# Patient Record
Sex: MALE | Race: WHITE
[De-identification: names, ages, dates, MRNs, and addresses within clinical notes are randomized per-mention and may not be internally consistent; named-entity substitution may affect disease eponyms.]

---

## 2018-08-30 ENCOUNTER — HOSPITAL ENCOUNTER (OUTPATIENT)
Dept: HOSPITAL 61 - RAD | Age: 53
Discharge: HOME | End: 2018-08-30
Attending: FAMILY MEDICINE
Payer: COMMERCIAL

## 2018-08-30 DIAGNOSIS — N32.89: Primary | ICD-10-CM

## 2018-08-30 PROCEDURE — 74455 X-RAY URETHRA/BLADDER: CPT

## 2018-08-30 RX ADMIN — DIATRIZOATE MEGLUMINE ONE ML: 180 INJECTION, SOLUTION INTRAVESICAL at 10:12

## 2018-08-30 NOTE — RAD
Voiding cystourethrogram, 8/30/2018:



History: Hematuria, bladder cramping



A Biggs catheter was placed in the bladder under aseptic conditions by nursing

personnel.  Contrast was then infused through the catheter into the bladder

via gravity.  Appropriate digital imaging was then obtained during filling and

voiding.  2.4 minutes of fluoroscopy time was utilized.  12 static and dynamic

fluoroscopic sequences were recorded.



There is mild bladder wall irregularity primarily along the dome of the

bladder.  No discrete bladder mass is seen.  There was no vesicoureteral

reflux upon filling or voiding.  The prostatic urethra is unremarkable.  There

is mild narrowing of the membranous urethra as it passes through the

urogenital diaphragm.  This is not unusual.  The anterior urethra is widely

patent.



After voiding on the fluoroscopic table there was a moderate volume of

residual urine in the bladder.  The patient was then sent to the bathroom

where he was able to nearly completely empty his bladder.  The post voiding

view demonstrates surgical clips in the scrotum, presumably due to a previous

vastectomy.





IMPRESSION:

1.  Mild mural irregularity along the superior wall of the urinary bladder

which may be inflammatory.  Given the patient's history of hematuria,

cystoscopic evaluation should be considered to exclude neoplasm.

2.  Mild narrowing of the membranous urethra of doubtful significance.

## 2018-09-24 ENCOUNTER — HOSPITAL ENCOUNTER (OUTPATIENT)
Dept: HOSPITAL 61 - INTRAD | Age: 53
Discharge: HOME | End: 2018-09-24
Attending: SURGERY
Payer: COMMERCIAL

## 2018-09-24 VITALS
SYSTOLIC BLOOD PRESSURE: 85 MMHG | DIASTOLIC BLOOD PRESSURE: 61 MMHG | DIASTOLIC BLOOD PRESSURE: 61 MMHG | SYSTOLIC BLOOD PRESSURE: 85 MMHG

## 2018-09-24 VITALS — HEIGHT: 68 IN | WEIGHT: 146 LBS | BODY MASS INDEX: 22.13 KG/M2
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
## 2018-09-24 DIAGNOSIS — T83.038A: Primary | ICD-10-CM

## 2018-09-24 PROCEDURE — 76080 X-RAY EXAM OF FISTULA: CPT

## 2018-09-24 PROCEDURE — C1894 INTRO/SHEATH, NON-LASER: HCPCS

## 2018-09-24 PROCEDURE — C1729 CATH, DRAINAGE: HCPCS

## 2018-09-24 PROCEDURE — 49423 EXCHANGE DRAINAGE CATHETER: CPT

## 2018-09-24 PROCEDURE — 49424 ASSESS CYST CONTRAST INJECT: CPT

## 2018-09-24 PROCEDURE — 75984 XRAY CONTROL CATHETER CHANGE: CPT

## 2018-09-25 NOTE — RAD
9/24/2018



1. Abscessogram

2. Replacement size of pelvic drainage catheter



Discussion: The risks and benefits of the procedure were discussed the

patient.  Informed consent was obtained.  Timeout procedure was performed. 

The patient was placed in the supine position on fluoroscopy table.  The

anterior pelvis was prepped and draped using sterile barrier technique.



The patient has a percutaneous drainage catheter into what appears to be

recurrent urachal cyst.  The pre-existing drainage catheter has been

intermittently clogging and leaking around the drain.  Fluoroscopically the

drain is normal in position.  Its position was confirmed with CT scan. 

Recurrent gas and air within the collection.  Gas is present within the

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

underlying bowel.  Despite multiple attempts by him he has been unable to see

urologists in the interim since discharge.  A small amount of contrast

administered into the drain delineating the cavity.  A guidewire was advanced

into the cavity over which the pre-existing drain was removed.  Following

dilatation a 14 Malagasy drainage catheter was placed.  Its position was

confirmed with contrast fluoroscopically.  The new catheter was used to

aspirate approximately 50 cc of turbid fluid.  The drain was secured in place

and sterile dressings were applied.



Fluoroscopy time: 1.1 minutes.

Dose area product: 3 gycm2



Impression: Exchange of drainage catheter, now 14 Malagasy in size.  50 cc of

turbid fluid was aspirated.  No immediate complications were identified.  Plan

is for patient to follow-up with urology at .  His appointment is scheduled

for November, will attempt to facilitate earlier visit.









PQRS Compliance Statement:



One or more of the following individualized dose reduction techniques were

utilized for this examination:  

1. Automated exposure control  

2. Adjustment of the mA and/or kV according to patient size  

3. Use of iterative reconstruction technique

## 2020-06-26 ENCOUNTER — HOSPITAL ENCOUNTER (OUTPATIENT)
Dept: HOSPITAL 61 - LAB | Age: 55
Discharge: HOME | End: 2020-06-26
Attending: SURGERY
Payer: COMMERCIAL

## 2020-06-26 DIAGNOSIS — C44.91: ICD-10-CM

## 2020-06-26 DIAGNOSIS — Z11.59: ICD-10-CM

## 2020-06-26 DIAGNOSIS — Z01.818: Primary | ICD-10-CM

## 2020-06-30 ENCOUNTER — HOSPITAL ENCOUNTER (OUTPATIENT)
Dept: HOSPITAL 61 - SURG | Age: 55
Discharge: HOME | End: 2020-06-30
Attending: SURGERY
Payer: COMMERCIAL

## 2020-06-30 VITALS — SYSTOLIC BLOOD PRESSURE: 161 MMHG | DIASTOLIC BLOOD PRESSURE: 89 MMHG

## 2020-06-30 VITALS — BODY MASS INDEX: 23.21 KG/M2 | WEIGHT: 144.4 LBS | HEIGHT: 66 IN

## 2020-06-30 DIAGNOSIS — Z72.89: ICD-10-CM

## 2020-06-30 DIAGNOSIS — Z98.52: ICD-10-CM

## 2020-06-30 DIAGNOSIS — Z90.49: ICD-10-CM

## 2020-06-30 DIAGNOSIS — C44.619: Primary | ICD-10-CM

## 2020-06-30 DIAGNOSIS — I10: ICD-10-CM

## 2020-06-30 PROCEDURE — 88305 TISSUE EXAM BY PATHOLOGIST: CPT

## 2020-06-30 PROCEDURE — 11606 EXC TR-EXT MAL+MARG >4 CM: CPT

## 2020-06-30 NOTE — PDOC4
Operative Note


Operative Note


Operative Note:





Preoperative Diagnosis: Left shoulder basal cell carcinoma





Postoperative Diagnosis: Same





Procedure: Excision of left shoulder basal cell carcinoma, 4 X 2.5 cm excised





Surgeon: Usman





Anesthesia: Local MAC





EBL: 10 mL





Specimen: Left shoulder basal cell, stitch at superior margin





Drains: None





Complications: None





Indication: The patient is a 54-year-old male who recently underwent a punch 

biopsy of a left shoulder skin lesion.  The pathology confirmed a basal cell 

carcinoma and he was referred for excision.  The plan is for excision of the 

lesion with clear margins.  The risks of surgery were discussed.  The risks 

include bleeding, infection, pain, scar tissue, potential need for additional 

surgery or procedure.  He understands and would like to proceed.





Description: The patient was taken the operating room and placed supine on the 

operating bed.  Monitored anesthesia care was provided.  The anterior left 

shoulder was prepped with ChloraPrep and draped in a standard surgical manner.  

The skin of the area was infiltrated with 1% lidocaine with epinephrine.  An 

elliptical incision was made around the skin lesion using a scalpel.  Care was 

taken to ensure fairly wide margins of the lesion.  Sharp dissection was used 

for full-thickness excision of the involved specimen.  The excised lesion measu

red 4 x 2.5 cm.  A stitch was used to freddy the superior margin and it was sent 

to pathology for evaluation.  Hemostasis was achieved with cautery.  The skin 

was reapproximated with interrupted 4-0 nylon sutures.  A sterile dressing was 

then applied.  The patient tolerated the procedure well and was sent to the 

recovery room in stable condition.  At the end of the case all counts were 

correct.











ANKIT TORRES MD             Jun 30, 2020 13:48

## 2020-06-30 NOTE — DISCH
DISCHARGE INSTRUCTIONS


Condition on Discharge


Condition on Discharge:  Stable





Activity After Discharge


Activity Instructions for Disc:  Resume previous activity


Driving Instructions after Dis:  Other, see below (no driving if taking pain 

meds)





Diet after Discharge


Diet after Discharge:  Regular





Wound Incision Care


Wound/Incision Care:  Other, see below (keep dressing clean and dry X 4 days, 

may then remove and shower,  reapply as necessary)





Follow-Up


Follow up with:  Dr Torres in office in 1 week, call for appt [phone]











ANKIT TORRES MD             Jun 30, 2020 13:50

## 2020-07-01 NOTE — PATHOLOGY
[facility] Accession Number: [id number]

.                                                                01

Material submitted:                                        .

shoulder - LEFT SHOULDER SKIN LESION. Modifiers: left

.                                                                01

Clinical history:                                          .

Basal cell carcinoma

.                                                                02

**********************************************************************

Diagnosis:

Skin and subcutaneous tissue, left shoulder lesion excision:

- Basal cell carcinoma, superficial subtype.

- Inked margins of excision free of neoplasm.

- Focal dermal scarring and foreign body giant cell reaction consistent

with previous biopsy site.

(JPM:valdo; 07/01/2020)

MBR  07/01/2020  1553 Local

**********************************************************************

.                                                                02

Electronically signed:                                     .

Benjamín Beal MD, Pathologist

NPI- [id number]

.                                                                01

Gross description:                                         .

The specimen is received in formalin, labeled "Faina Bernal, left shoulder,

stitch at superior margin".  Received is an oriented ellipse of skin

measuring 3.6 x 1.7 x 0.5 cm in greatest dimensions with a suture placed

along one edge designating this as the superior margin (12:00).  The

surgical margins are inked as follows: 12 to 3:00-yellow, 3 to 9:00-black,

and 9 to 12:00-blue.  The epidermal surface displays a well-circumscribed,

flat, flaky and pale tan lesion measuring 0.7 x 0.7 cm.  The specimen is

sectioned into 11 pieces and entirely submitted in cassettes A1 through

A4, with the 3 and 9:00 aspects placed in cassette A4.

(CAA; 6/30/2020)

QAC/QAC  06/30/2020  1746 Local

.                                                                02

Pathologist provided ICD-10:

C44.619

.                                                                02

CPT                                                        .

641705

Specimen Comment: A courtesy copy of this report has been sent to [phone], [phone]

Specimen Comment: [id number]

Specimen Comment: Report sent to  / DR SÁNCHEZ

***Performed at:  01

   [facility] [address], [place]  [id number]

   MD Saad Nuñez MD Phone:  [phone]

***Performed at:  02

   [facility]

   [address] [place], [place]  [id number]

   MD Benjamín Beal MD Phone:  [phone]

## 2022-04-07 ENCOUNTER — HOSPITAL ENCOUNTER (INPATIENT)
Dept: HOSPITAL 61 - ER | Age: 57
LOS: 4 days | Discharge: HOME | DRG: 871 | End: 2022-04-11
Attending: INTERNAL MEDICINE | Admitting: INTERNAL MEDICINE
Payer: COMMERCIAL

## 2022-04-07 VITALS — SYSTOLIC BLOOD PRESSURE: 106 MMHG | DIASTOLIC BLOOD PRESSURE: 75 MMHG

## 2022-04-07 VITALS — SYSTOLIC BLOOD PRESSURE: 107 MMHG | DIASTOLIC BLOOD PRESSURE: 76 MMHG

## 2022-04-07 VITALS — BODY MASS INDEX: 22.66 KG/M2 | WEIGHT: 144.4 LBS | HEIGHT: 67 IN

## 2022-04-07 VITALS — DIASTOLIC BLOOD PRESSURE: 64 MMHG | SYSTOLIC BLOOD PRESSURE: 108 MMHG

## 2022-04-07 DIAGNOSIS — R00.0: ICD-10-CM

## 2022-04-07 DIAGNOSIS — Z93.3: ICD-10-CM

## 2022-04-07 DIAGNOSIS — M10.9: ICD-10-CM

## 2022-04-07 DIAGNOSIS — Z82.49: ICD-10-CM

## 2022-04-07 DIAGNOSIS — E43: ICD-10-CM

## 2022-04-07 DIAGNOSIS — N18.30: ICD-10-CM

## 2022-04-07 DIAGNOSIS — I12.9: ICD-10-CM

## 2022-04-07 DIAGNOSIS — F10.10: ICD-10-CM

## 2022-04-07 DIAGNOSIS — L03.114: ICD-10-CM

## 2022-04-07 DIAGNOSIS — Z90.49: ICD-10-CM

## 2022-04-07 DIAGNOSIS — A41.9: Primary | ICD-10-CM

## 2022-04-07 DIAGNOSIS — N17.9: ICD-10-CM

## 2022-04-07 DIAGNOSIS — Z87.11: ICD-10-CM

## 2022-04-07 LAB
ALBUMIN SERPL-MCNC: 2.6 G/DL (ref 3.4–5)
ALBUMIN/GLOB SERPL: 0.6 {RATIO} (ref 1–1.7)
ALP SERPL-CCNC: 107 U/L (ref 46–116)
ALT SERPL-CCNC: 10 U/L (ref 16–63)
ANION GAP SERPL CALC-SCNC: 14 MMOL/L (ref 6–14)
APTT BLD: 47 SEC (ref 24–38)
AST SERPL-CCNC: 9 U/L (ref 15–37)
BASOPHILS # BLD AUTO: 0.1 X10^3/UL (ref 0–0.2)
BASOPHILS NFR BLD: 1 % (ref 0–3)
BILIRUB SERPL-MCNC: 0.8 MG/DL (ref 0.2–1)
BUN SERPL-MCNC: 19 MG/DL (ref 8–26)
BUN/CREAT SERPL: 11 (ref 6–20)
CALCIUM SERPL-MCNC: 10.1 MG/DL (ref 8.5–10.1)
CHLORIDE SERPL-SCNC: 101 MMOL/L (ref 98–107)
CO2 SERPL-SCNC: 22 MMOL/L (ref 21–32)
CREAT SERPL-MCNC: 1.7 MG/DL (ref 0.7–1.3)
EOSINOPHIL NFR BLD: 0.1 X10^3/UL (ref 0–0.7)
EOSINOPHIL NFR BLD: 1 % (ref 0–3)
ERYTHROCYTE [DISTWIDTH] IN BLOOD BY AUTOMATED COUNT: 18.6 % (ref 11.5–14.5)
GFR SERPLBLD BASED ON 1.73 SQ M-ARVRAT: 41.6 ML/MIN
GLUCOSE SERPL-MCNC: 111 MG/DL (ref 70–99)
HCT VFR BLD CALC: 27.4 % (ref 39–53)
HGB BLD-MCNC: 9 G/DL (ref 13–17.5)
LYMPHOCYTES # BLD: 0.8 X10^3/UL (ref 1–4.8)
LYMPHOCYTES NFR BLD AUTO: 8 % (ref 24–48)
MCH RBC QN AUTO: 29 PG (ref 25–35)
MCHC RBC AUTO-ENTMCNC: 33 G/DL (ref 31–37)
MCV RBC AUTO: 89 FL (ref 79–100)
MONO #: 0.7 X10^3/UL (ref 0–1.1)
MONOCYTES NFR BLD: 7 % (ref 0–9)
NEUT #: 8.7 X10^3/UL (ref 1.8–7.7)
NEUTROPHILS NFR BLD AUTO: 84 % (ref 31–73)
PLATELET # BLD AUTO: 262 X10^3/UL (ref 140–400)
POTASSIUM SERPL-SCNC: 4 MMOL/L (ref 3.5–5.1)
PROT SERPL-MCNC: 6.9 G/DL (ref 6.4–8.2)
PROTHROMBIN TIME: 15 SEC (ref 11.7–14)
RBC # BLD AUTO: 3.08 X10^6/UL (ref 4.3–5.7)
SODIUM SERPL-SCNC: 137 MMOL/L (ref 136–145)
URATE SERPL-MCNC: 11 MG/DL (ref 3.5–7.2)
WBC # BLD AUTO: 10.4 X10^3/UL (ref 4–11)

## 2022-04-07 PROCEDURE — 84443 ASSAY THYROID STIM HORMONE: CPT

## 2022-04-07 PROCEDURE — 84550 ASSAY OF BLOOD/URIC ACID: CPT

## 2022-04-07 PROCEDURE — 96375 TX/PRO/DX INJ NEW DRUG ADDON: CPT

## 2022-04-07 PROCEDURE — 96365 THER/PROPH/DIAG IV INF INIT: CPT

## 2022-04-07 PROCEDURE — 73130 X-RAY EXAM OF HAND: CPT

## 2022-04-07 PROCEDURE — 36415 COLL VENOUS BLD VENIPUNCTURE: CPT

## 2022-04-07 PROCEDURE — 71045 X-RAY EXAM CHEST 1 VIEW: CPT

## 2022-04-07 PROCEDURE — 82550 ASSAY OF CK (CPK): CPT

## 2022-04-07 PROCEDURE — 85651 RBC SED RATE NONAUTOMATED: CPT

## 2022-04-07 PROCEDURE — 80048 BASIC METABOLIC PNL TOTAL CA: CPT

## 2022-04-07 PROCEDURE — 84484 ASSAY OF TROPONIN QUANT: CPT

## 2022-04-07 PROCEDURE — 80053 COMPREHEN METABOLIC PANEL: CPT

## 2022-04-07 PROCEDURE — 86140 C-REACTIVE PROTEIN: CPT

## 2022-04-07 PROCEDURE — 83550 IRON BINDING TEST: CPT

## 2022-04-07 PROCEDURE — 85610 PROTHROMBIN TIME: CPT

## 2022-04-07 PROCEDURE — 87040 BLOOD CULTURE FOR BACTERIA: CPT

## 2022-04-07 PROCEDURE — 85025 COMPLETE CBC W/AUTO DIFF WBC: CPT

## 2022-04-07 PROCEDURE — G0378 HOSPITAL OBSERVATION PER HR: HCPCS

## 2022-04-07 PROCEDURE — 93005 ELECTROCARDIOGRAM TRACING: CPT

## 2022-04-07 PROCEDURE — 83540 ASSAY OF IRON: CPT

## 2022-04-07 PROCEDURE — 86200 CCP ANTIBODY: CPT

## 2022-04-07 PROCEDURE — 86431 RHEUMATOID FACTOR QUANT: CPT

## 2022-04-07 PROCEDURE — 96361 HYDRATE IV INFUSION ADD-ON: CPT

## 2022-04-07 PROCEDURE — 83036 HEMOGLOBIN GLYCOSYLATED A1C: CPT

## 2022-04-07 PROCEDURE — 73218 MRI UPPER EXTREMITY W/O DYE: CPT

## 2022-04-07 PROCEDURE — 85730 THROMBOPLASTIN TIME PARTIAL: CPT

## 2022-04-07 PROCEDURE — 83605 ASSAY OF LACTIC ACID: CPT

## 2022-04-07 RX ADMIN — FEBUXOSTAT SCH MG: 40 TABLET ORAL at 15:39

## 2022-04-07 RX ADMIN — MINERAL SUPPLEMENT IRON 300 MG / 5 ML STRENGTH LIQUID 100 PER BOX UNFLAVORED SCH MG: at 16:29

## 2022-04-07 RX ADMIN — FAMOTIDINE SCH MG: 20 TABLET ORAL at 21:32

## 2022-04-07 RX ADMIN — VANCOMYCIN HYDROCHLORIDE PRN EACH: 1 INJECTION, POWDER, LYOPHILIZED, FOR SOLUTION INTRAVENOUS at 15:01

## 2022-04-07 NOTE — RAD
Single AP view of the chest.



Comparison:  9/15/2018.



Indication: Tachycardia



Findings: 



Healed left midclavicular fractures are identified. Stable degenerative changes of bilateral shoulder
s. The heart is enlarged but stable.  There is no pneumothorax or effusion. No air space or interstit
ial disease.



Impression: 



1. No acute cardiopulmonary process.





Electronically signed by: Ancelmo Chester MD (4/7/2022 11:43 AM) [id number]

## 2022-04-07 NOTE — EKG
[facility]

              [address] [place] [address]

Test Date:    2022               Test Time:    10:27:03

Pat Name:     RUBY HAUSER              Department:   

Patient ID:   [id number]           Room:          

Gender:       M                        Technician:   

:          [id number]               Requested By: RAYNA LOPEZ

Order Number: [id number]           Reading MD:   Tam Valdes

                                 Measurements

Intervals                              Axis          

Rate:         123                      P:            24

CT:           168                      QRS:          0

QRSD:         80                       T:            174

QT:           286                                    

QTc:          414                                    

                           Interpretive Statements

SINUS TACHYCARDIA

LEFTWARD AXIS

LOW LIMB LEAD VOLTAGE

Electronically Signed On 4- 14:22:46 CDT by Tam Valdes

## 2022-04-07 NOTE — RAD
Study: MRI of the left hand without contrast



INDICATION: Left hand infection. Abscess evaluation.



COMPARISON: Radiographs from 4/7/2022



TECHNIQUE: Multiplanar MR imaging of the left hand performed without contrast.



FINDINGS:



Confluent fluid attenuation along the dorsal half of the abductor digiti minimi muscle. The muscle be
lly itself is minimally edematous at its base. Subcutaneous edema/fluid signal mainly along the dorsu
m of the hand and wrist and most confluent along the metacarpals. Small volume extensor tendon sheath
 fluid with the exception of the first compartment. Peritendinous edema/fluid along the extensors mor
e distally. No significant fluid distention on the flexor tendon sheaths and with less pronounced fle
xor peritendinous edema along the hand compared to the extensors. Wrist and distal radioulnar joint e
ffusions which are small/moderate and with synovitis.



Small joint effusions and mild synovitis primarily at the index, long and little finger MCP joints an
d long finger PIP joint.



Severe thumb CMC arthrosis. Joint space narrowing at the index and long finger MCP joints. Multifocal
 interphalangeal joint arthrosis greatest at the index finger DIP joint. Distal radioulnar joint arth
rosis. No signal changes of osteomyelitis. No aggressive erosion.



IMPRESSION:

1.  The study is limited in its ability to detect an abscess without intravenous contrast.

2.  Confluent fluid signal centered along the dorsal half of the abductor digiti minimi the sterility
 of which is uncertain. There is also edema and fluid signal primarily along the dorsum of the hand d
own to the wrist and greatest along the metacarpals. This is indeterminate for bland edema, celluliti
s or phlegmonous change which are not able to be differentiated by noncontrast MRI. Small/moderate wr
ist and DRUJ effusions with synovitis. It is uncertain how much fluid is present as synovial thickeni
ng cannot be differentiated from fluid. No signal changes of osteomyelitis. If there is concern for s
eptic arthritis or an abscess, ultrasound could assess for a drainable fluid collection and determine
 how much fluid is present within the joint spaces.

3.  Small joint effusions/synovitis primarily involving the index, long and little finger MCP joints 
and the long finger PIP joint also sterility indeterminant.

4.  Multifocal arthrosis which is severe at the thumb CMC joint.



Electronically signed by: BRII CABA MD (4/7/2022 9:53 PM) [facility]IC-ONOF

## 2022-04-07 NOTE — PHYS DOC
Past Medical History


Past Medical History:  Diverticulitis, GI Bleed, Hypertension, P.U.D., Renal 

Failure


Additional Past Medical Histor:  Gout


Past Surgical History:  Other


Additional Past Surgical Histo:  CYST REMOVAL, BLADDER SX, bowel resection with 

reversal


Smoking Status:  Never Smoker


Alcohol Use:  Sober


Additional Information:  


Pt states sober x 14 weeks.





General Adult


EDM:


Chief Complaint:  HAND PROBLEM





HPI:


HPI:





Patient is a 56-year-old male who presents today with right hand pain.  While 

the nursing staff was doing the triage assessment of this patient it was found 

that the heart patient's heart rate was in the 120s to 130s, patient was then 

moved to room and placed on the cardiac monitor.  Patient states that his hand 

pain started on Monday and that he has had no fever and chills related to it he 

says is related to his gout.  But after talking with the patient further come to

find out the patient was admitted to [facility] on February 28, 2022 after 

syncopal episode and patient was found to have a bleeding ulcer, he states that 

while in the hospital he required an EGD, and transfusions x2 units.  Patient 

states he was also in the intensive care unit for couple days and intubated at 

that time.  Patient states he saw his primary care physician last week Dr. Hamilton

and his medications were adjusted due to his low blood pressures.





Review of Systems:


Review of Systems:


Constitutional:   Denies fever or chills. []


Eyes:   Denies change in visual acuity. []


HENT:   Denies nasal congestion or sore throat. [] 


Respiratory:   Denies cough or shortness of breath. [] 


Cardiovascular:   Denies chest pain or edema. [] 


GI:   Denies abdominal pain, nausea, vomiting, bloody stools or diarrhea. [] 


:  Denies dysuria. [] 


Musculoskeletal: Left hand pain and swelling 


Integument:   Denies rash. [] 


Neurologic:   Denies headache, focal weakness or sensory changes. [] 


Endocrine:   Denies polyuria or polydipsia. [] 


Lymphatic:  Denies swollen glands. [] 


Psychiatric:  Denies depression or anxiety. []





Heart Score:


C/O Chest Pain:  No


Risk Factors:


Risk Factors:  DM, Current or recent (<one month) smoker, HTN, HLP, family 

history of CAD, obesity.


Risk Scores:


Score 0 - 3:  2.5% MACE over next 6 weeks - Discharge Home


Score 4 - 6:  20.3% MACE over next 6 weeks - Admit for Clinical Observation


Score 7 - 10:  72.7% MACE over next 6 weeks - Early Invasive Strategies





Allergies:


Allergies:





Allergies








Coded Allergies Type Severity Reaction Last Updated Verified


 


  No Known Drug Allergies    6/30/20 No











Physical Exam:


PE:





Constitutional: Well developed, well nourished, no acute distress, non-toxic 

appearance. []


HENT: Normocephalic, atraumatic, bilateral external ears normal, oropharynx 

moist, no oral exudates, nose normal. []


Eyes: PERRLA, EOMI, conjunctiva normal, no discharge. [] 


Neck: Normal range of motion, no tenderness, supple, no stridor. [] 


Cardiovascular:Heart rate regular rhythm, no murmur []


Lungs & Thorax:  Bilateral breath sounds clear to auscultation []


Abdomen: Bowel sounds normal, soft, no tenderness, no masses, no pulsatile 

masses, well-healed scar from the umbilicus to the symphysis pubis noted


Skin: Warm, dry, no erythema, no rash. [] 


Back: No tenderness, no CVA tenderness. [] 


Extremities: Right hand is swollen and reddened along the metacarpal joints, 

patient has decreased range of motion due to pain and swelling, cap refill is 

less than 2 seconds sensory is intact distal to the swelling and pain, 

peripheral pulses are 2+


Neurologic: Alert and oriented X 3, normal motor function, normal sensory 

function, no focal deficits noted. []


Psychologic: Affect normal, judgement normal, mood anxious. []





Current Patient Data:


Labs:





Laboratory Tests








Test


 4/7/22


11:14


 


White Blood Count 10.4 x10^3/uL 


 


Red Blood Count 3.08 x10^6/uL 


 


Hemoglobin 9.0 g/dL 


 


Hematocrit 27.4 % 


 


Mean Corpuscular Volume 89 fL 


 


Mean Corpuscular Hemoglobin 29 pg 


 


Mean Corpuscular Hemoglobin


Concent 33 g/dL 





 


Red Cell Distribution Width 18.6 % 


 


Platelet Count 262 x10^3/uL 


 


Neutrophils (%) (Auto) 84 % 


 


Lymphocytes (%) (Auto) 8 % 


 


Monocytes (%) (Auto) 7 % 


 


Eosinophils (%) (Auto) 1 % 


 


Basophils (%) (Auto) 1 % 


 


Neutrophils # (Auto) 8.7 x10^3/uL 


 


Lymphocytes # (Auto) 0.8 x10^3/uL 


 


Monocytes # (Auto) 0.7 x10^3/uL 


 


Eosinophils # (Auto) 0.1 x10^3/uL 


 


Basophils # (Auto) 0.1 x10^3/uL 


 


Sodium Level 137 mmol/L 


 


Potassium Level 4.0 mmol/L 


 


Chloride Level 101 mmol/L 


 


Carbon Dioxide Level 22 mmol/L 


 


Anion Gap 14 


 


Blood Urea Nitrogen 19 mg/dL 


 


Creatinine 1.7 mg/dL 


 


Estimated GFR


(Cockcroft-Gault) 41.6 





 


BUN/Creatinine Ratio 11 


 


Glucose Level 111 mg/dL 


 


Lactic Acid Level 2.6 mmol/L 


 


Uric Acid 11.0 mg/dL 


 


Calcium Level 10.1 mg/dL 


 


Total Bilirubin 0.8 mg/dL 


 


Aspartate Amino Transf


(AST/SGOT) 9 U/L 





 


Alanine Aminotransferase


(ALT/SGPT) 10 U/L 





 


Alkaline Phosphatase 107 U/L 


 


Troponin I High Sensitivity 7 ng/L 


 


Total Protein 6.9 g/dL 


 


Albumin 2.6 g/dL 


 


Albumin/Globulin Ratio 0.6 








Current Medications








 Medications


  (Trade)  Dose


 Ordered  Sig/Kim


 Route


 PRN Reason  Start Time


 Stop Time Status Last Admin


Dose Admin


 


 Sodium Chloride  1,000 ml @ 


 999 mls/hr  1X  ONCE


 IV


   4/7/22 12:15


 4/7/22 13:15  4/7/22 12:28





 


 Fentanyl Citrate


  (Fentanyl 2ml


 Vial)  50 mcg  1X  ONCE


 IVP


   4/7/22 12:45


 4/7/22 12:46  4/7/22 12:29











Vital Signs:





Vital Signs








  Date Time  Temp Pulse Resp B/P (MAP) Pulse Ox O2 Delivery O2 Flow Rate FiO2


 


4/7/22 12:30  116 18 113/84 (94) 100 Room Air  


 


4/7/22 12:29   18  98   


 


4/7/22 12:00  115 18 113/81 (92) 100 Room Air  


 


4/7/22 11:30  114 18 111/85 (94) 100 Room Air  


 


4/7/22 11:00  119 18 119/82 (94) 100 Room Air  


 


4/7/22 10:32  121 20 115/79 (91) 100 Room Air  


 


4/7/22 10:06 98.9 154 22 110/80 (90) 96 Room Air  





 98.9       








Vital Signs








  Date Time  Temp Pulse Resp B/P (MAP) Pulse Ox O2 Delivery O2 Flow Rate FiO2


 


4/7/22 12:29   18  98   


 


4/7/22 10:32  121 20 115/79 (91) 100 Room Air  


 


4/7/22 10:06 98.9 154 22 110/80 (90) 96 Room Air  





 98.9       








                                   Vital Signs








  Date Time  Temp Pulse Resp B/P (MAP) Pulse Ox O2 Delivery O2 Flow Rate FiO2


 


4/7/22 10:06 98.9 154 22 110/80 (90) 96 Room Air  





 98.9       











EKG:


EKG:


EKG done at 1027 read by Dr. Winters at 1029 shows sinus tachycardia with no 

ectopy at a rate of 123 with a PA interval of 168 ms with a QTC of 414 no STEMI 

[]





Radiology/Procedures:


Radiology/Procedures:


REASON: hand swelling. HX OF GOUT


PROCEDURE: HAND LEFT 3V





EXAM:  PA, oblique and lateral views left hand





DATE: 4/7/2022 10:40 AM





INDICATION: Reason: hand swelling. HX OF GOUT / Spl. Instructions:  / History: .





COMPARISON: No Prior





FINDINGS:


No evidence of acute fracture or dislocation. Diffuse soft tissue swelling about

 the left long, index, and ring fingers. Advanced thumb CMC DJD. Scattered IP 

joint degenerative changes. Small juxta-articular erosion is suspected at the 

distal phalanx left index finger. Old/healed fracture fifth metacarpal shaft. 

Atherosclerotic vascular calcifications are seen.





IMPRESSION:


No acute fracture or dislocation.


Diffuse soft tissue swelling about the digits as well as the extra articular 

erosion at the middle phalanx left index finger could be seen with crystalline 

arthropathy such as gout. 





Electronically signed by: Shakeel Thompson MD (4/7/2022 11:54 AM) [id number]





REASON: tachycardia


PROCEDURE: CHEST AP ONLY





Single AP view of the chest.





Comparison:  9/15/2018.





Indication: Tachycardia





Findings: 





Healed left midclavicular fractures are identified. Stable degenerative changes 

of bilateral shoulders. The heart is enlarged but stable.  There is no 

pneumothorax or effusion. No air space or interstitial disease.





Impression: 





1. No acute cardiopulmonary process.








Electronically signed by: Ancelmo Chester MD (4/7/2022 11:43 AM) [id number]

















[]





Course & Med Decision Making:


Course & Med Decision Making


Pertinent Labs and Imaging studies reviewed. (See chart for details)





1240 reviewed radiological and laboratory results with patient did express 

concern about the possibility of having cellulitis in his left hand, and 

admitting the patient for IV antibiotics is my recommendation, he is agreeable 

to the plan, Dr. Bartholomew was paged.





Vance Disclaimer:


Dragon Disclaimer:


This electronic medical record was generated, in whole or in part, using a voice

 recognition dictation system.





Departure


Departure


Impression:  


   Primary Impression:  


   Cellulitis


   Qualified Codes:  L03.114 - Cellulitis of left upper limb


Admitting Physician:  HIMS


Condition:  STABLE


Referrals:  


JOSEPH SÁNCHEZ MD (PCP)











RAYNA LOPEZ        Apr 7, 2022 10:49

## 2022-04-07 NOTE — PDOC2
CONSULT


Date of Consult


Date of Consult


DATE: 22 


TIME: 16:53





Reason for Consult


Reason for Consult:


L hand swelling/pain/cellulitis





Identification/Chief Complaint


Chief Complaint


L hand pain/swelling/unable to use





History of Present Illness


Reason for Visit:


Patient reports that he started having pain and swelling in his left hand on 

2022.  He denies any injury to the hand and says he is not sure why it 

started hurting and swelling.  He thought that it would just go away on its own 

but it continued to get worse.  He presented to the emergency room at [facility]

today for treatment of it because it continued to bother him.  He denies any 

fevers at home but says that he was having chills, he is not exactly sure when 

this started.  He denies any numbness or tingling or radicular symptoms in the 

hand.  No other areas of concern today.





The patient reports that about a month ago he was admitted at [facility] for 

a stomach ulcer.  He was having black stools and stomach pain and was found to 

have an ulcer that he says was cauterized.  His symptoms for that have resolved 

since he left the hospital.





Past Medical History


Past Medical History


Significant for hypertension.  The chart is positive for gout but the patient 

does not mention this.


Cardiovascular:  HTN


Pulmonary:  No pertinent hx


GI:  No pertinent hx


Heme/Onc:  No pertinent hx


Psych:  No pertinent hx


Musculoskeletal:   low back pain


Infectious disease:  No pertinent hx


Renal/:  No pertinent hx





Past Surgical History


Past Surgical History


Significant for cyst removal around his umbilicus that was infected, colon 

perforation which resulted in colostomy.  He then had a partial colon resection 

and then finally a reattachment of his colon.  Is also had right elbow 

reconstruction, left ACL reconstruction, gallbladder removal, appendectomy and 

recent stomach ulcer cauterization.


Past Surgical History:  Other





Family History


Family History


Patient reports that his niece had blood clots in her legs that she  from.


Family History:  Hypertension





Social History


Social History


Patient reports that he chews about a can of tobacco a week and has for 

approximately 48 years.  He reports that he quit drinking alcohol about 14 weeks

ago without formal treatment.  He has been in formal alcohol treatment in the 

past.  He says that prior to quitting, he drank a lot and does not quantify 

further.  He denies drug use.  He lives at home in the community.  He does not w

ork.


No


ALCOHOL:  none


Drugs:  None


Lives:  with Family





Current Problem List


Problem List


Problems


Medical Problems:


(1) Cellulitis


Status: Acute  











Current Medications


Current Medications


Home medications per the patient are magnesium and a blood pressure medicine, 

unknown name.  He says that his metoprolol was stopped at his recent primary 

care visit.


Current Medications


Sodium Chloride 1,000 ml @  999 mls/hr 1X  ONCE IV  Last administered on 

22at 12:28;  Start 22 at 12:15;  Stop 22 at 13:15;  Status DC


Fentanyl Citrate (Fentanyl 2ml Vial) 50 mcg 1X  ONCE IVP  Last administered on 

22at 12:29;  Start 22 at 12:45;  Stop 22 at 12:46;  Status DC


Vancomycin HCl (Vanco Per Pharmacy) 1 each PRN DAILY  PRN MC SEE COMMENTS Last 

administered on 22at 15:01;  Start 22 at 12:45


Vancomycin HCl 1.25 gm/Sodium Chloride 250 ml @  166.667 mls/hr 1X  ONCE IV  

Last administered on 22at 13:12;  Start 22 at 13:00;  Stop 22 at 

14:29;  Status DC


Fentanyl Citrate (Fentanyl 2ml Vial) 50 mcg PRN Q2HR  PRN IVP PAIN;  Start 

22 at 13:00;  Stop 22 at 12:59


Febuxostat (Uloric) 40 mg DAILY PO  Last administered on 22at 15:39;  Start 

22 at 15:00


Metoprolol Succinate (Toprol Xl) 100 mg DAILY PO ;  Start 22 at 09:00;  Stop

22 at 14:57;  Status DC


Colchicine (Colcrys) 0.6 mg 1X  ONCE PO  Last administered on 22at 15:39;  

Start 22 at 15:00;  Stop 22 at 15:01;  Status DC


Colchicine (Colcrys) 0.6 mg DAILY PO ;  Start 22 at 09:00


Famotidine (Pepcid) 20 mg QHS PO ;  Start 22 at 21:00


Metoprolol Succinate (Toprol Xl) 50 mg DAILY PO ;  Start 22 at 09:00


Sodium Chloride 1,000 ml @  125 mls/hr 1X  ONCE IV  Last administered on at 15:40;  Start 22 at 15:00;  Stop 22 at 22:59


Sodium Chloride 1,000 ml @  1,000 mls/hr 1X  ONCE IV  Last administered on 

22at 15:40;  Start 22 at 15:00;  Stop 22 at 15:59;  Status DC


Vancomycin HCl 1 gm/Sodium Chloride 250 ml @  250 mls/hr Q24H IV ;  Start 22

at 13:00


Vancomycin HCl (Vancomycin Trough Level) 1 each 1X  ONCE MC ;  Start 22 at 

12:30;  Stop 22 at 12:31


Enoxaparin Sodium (Lovenox Per Pharmacy Prophylaxis Dosing) 1 each PRN DAILY  

PRN MC SEE COMMENTS;  Start 22 at 15:00


Vitamin B Complex/ Vitamin C (Edda-Monique) 1 tab DAILY PO ;  Start 22 at 09:00


Ferrous Sulfate (Iron Oral Solution) 300 mg DAILYBFRSUP PO ;  Start 22 at 

17:00


Acetaminophen (Tylenol) 650 mg PRN Q6HRS  PRN PO MILD PAIN / TEMP > 100.3'F;  

Start 22 at 16:15





Active Scripts


Active


Reported


Aspir-Low (Aspirin) 81 Mg Tablet.dr 1 Tab PO DAILY


Indomethacin 50 Mg Capsule 1 Cap PO TID PRN


Metoprolol Succinate 50 Mg Tab.er.24h 100 Mg PO DAILY





Allergies


Allergies:  


Coded Allergies:  


     No Known Drug Allergies (Unverified , 20)





ROS


Review of System


Patient denies headache.  He denies any blurry vision or double vision.  No 

ringing in the ears or difficulty hearing.  No difficulty swallowing.  No 

thyroid problems or diabetes.  No chest pain or shortness of breath.  No nausea 

or vomiting.  He has had pain in his abdomen from his recent stomach ulcer but 

that has resolved.  He has had blood in his stool but that has resolved after 

his ulcer was cauterized.  No blood in the urine.  No constipation or diarrhea. 

No dysuria.  No psychiatric illnesses.


General:  YES: Chills





Physical Exam


Physical Exam


Patient is a 56-year-old man who appears well-developed, well-nourished and in 

no acute distress.  He appears slightly diaphoretic.  He interacts with exam 

appropriately and is alert and oriented x3.  He is left-hand dominant.





The left hand has intact skin.  He does have scattered scars on the dorsum of 

the hand.  There is moderate swelling and erythema in the dorsum of the hand.  

Specifically he does have erythema over the distal ulna.  He has significant 

swelling in the long finger.  The patient will not allow any passive range of 

motion of his fingers hand or wrist due to pain.  There is warmth from the hand 

when compared to the contralateral side.  There is no swelling or erythema in 

the palm of the hand or the flexor surfaces of the fingers.  2+ radial and 2+ 

ulnar pulses.  Normal sensation to light touch throughout the hand.  He will not

move his hand for evaluation of his motor function.  The patient does guard his 

hand significantly.





Vitals


VITALS





Vital Signs








  Date Time  Temp Pulse Resp B/P (MAP) Pulse Ox O2 Delivery O2 Flow Rate FiO2


 


22 15:00 100.8 122 18 107/76 (86)  Room Air  





 100.8       


 


22 12:30     100   











Labs


Labs





Laboratory Tests








Test


 22


11:14 22


15:39


 


White Blood Count


 10.4 x10^3/uL


(4.0-11.0) 





 


Red Blood Count


 3.08 x10^6/uL


(4.30-5.70) 





 


Hemoglobin


 9.0 g/dL


(13.0-17.5) 





 


Hematocrit


 27.4 %


(39.0-53.0) 





 


Mean Corpuscular Volume 89 fL ()  


 


Mean Corpuscular Hemoglobin 29 pg (25-35)  


 


Mean Corpuscular Hemoglobin


Concent 33 g/dL


(31-37) 





 


Red Cell Distribution Width


 18.6 %


(11.5-14.5) 





 


Platelet Count


 262 x10^3/uL


(140-400) 





 


Neutrophils (%) (Auto) 84 % (31-73)  


 


Lymphocytes (%) (Auto) 8 % (24-48)  


 


Monocytes (%) (Auto) 7 % (0-9)  


 


Eosinophils (%) (Auto) 1 % (0-3)  


 


Basophils (%) (Auto) 1 % (0-3)  


 


Neutrophils # (Auto)


 8.7 x10^3/uL


(1.8-7.7) 





 


Lymphocytes # (Auto)


 0.8 x10^3/uL


(1.0-4.8) 





 


Monocytes # (Auto)


 0.7 x10^3/uL


(0.0-1.1) 





 


Eosinophils # (Auto)


 0.1 x10^3/uL


(0.0-0.7) 





 


Basophils # (Auto)


 0.1 x10^3/uL


(0.0-0.2) 





 


Sodium Level


 137 mmol/L


(136-145) 





 


Potassium Level


 4.0 mmol/L


(3.5-5.1) 





 


Chloride Level


 101 mmol/L


() 





 


Carbon Dioxide Level


 22 mmol/L


(21-32) 





 


Anion Gap 14 (6-14)  


 


Blood Urea Nitrogen


 19 mg/dL


(8-26) 





 


Creatinine


 1.7 mg/dL


(0.7-1.3) 





 


Estimated GFR


(Cockcroft-Gault) 41.6 


 





 


BUN/Creatinine Ratio 11 (6-20)  


 


Glucose Level


 111 mg/dL


(70-99) 





 


Lactic Acid Level


 2.6 mmol/L


(0.4-2.0) 1.4 mmol/L


(0.4-2.0)


 


Uric Acid


 11.0 mg/dL


(3.5-7.2) 





 


Calcium Level


 10.1 mg/dL


(8.5-10.1) 





 


Total Bilirubin


 0.8 mg/dL


(0.2-1.0) 





 


Aspartate Amino Transf


(AST/SGOT) 9 U/L (15-37) 


 





 


Alanine Aminotransferase


(ALT/SGPT) 10 U/L (16-63) 


 





 


Alkaline Phosphatase


 107 U/L


() 





 


Troponin I High Sensitivity 7 ng/L (4-75)  


 


Total Protein


 6.9 g/dL


(6.4-8.2) 





 


Albumin


 2.6 g/dL


(3.4-5.0) 





 


Albumin/Globulin Ratio 0.6 (1.0-1.7)  








Laboratory Tests








Test


 22


11:14 22


15:39


 


White Blood Count


 10.4 x10^3/uL


(4.0-11.0) 





 


Red Blood Count


 3.08 x10^6/uL


(4.30-5.70) 





 


Hemoglobin


 9.0 g/dL


(13.0-17.5) 





 


Hematocrit


 27.4 %


(39.0-53.0) 





 


Mean Corpuscular Volume 89 fL ()  


 


Mean Corpuscular Hemoglobin 29 pg (25-35)  


 


Mean Corpuscular Hemoglobin


Concent 33 g/dL


(31-37) 





 


Red Cell Distribution Width


 18.6 %


(11.5-14.5) 





 


Platelet Count


 262 x10^3/uL


(140-400) 





 


Neutrophils (%) (Auto) 84 % (31-73)  


 


Lymphocytes (%) (Auto) 8 % (24-48)  


 


Monocytes (%) (Auto) 7 % (0-9)  


 


Eosinophils (%) (Auto) 1 % (0-3)  


 


Basophils (%) (Auto) 1 % (0-3)  


 


Neutrophils # (Auto)


 8.7 x10^3/uL


(1.8-7.7) 





 


Lymphocytes # (Auto)


 0.8 x10^3/uL


(1.0-4.8) 





 


Monocytes # (Auto)


 0.7 x10^3/uL


(0.0-1.1) 





 


Eosinophils # (Auto)


 0.1 x10^3/uL


(0.0-0.7) 





 


Basophils # (Auto)


 0.1 x10^3/uL


(0.0-0.2) 





 


Sodium Level


 137 mmol/L


(136-145) 





 


Potassium Level


 4.0 mmol/L


(3.5-5.1) 





 


Chloride Level


 101 mmol/L


() 





 


Carbon Dioxide Level


 22 mmol/L


(21-32) 





 


Anion Gap 14 (6-14)  


 


Blood Urea Nitrogen


 19 mg/dL


(8-26) 





 


Creatinine


 1.7 mg/dL


(0.7-1.3) 





 


Estimated GFR


(Cockcroft-Gault) 41.6 


 





 


BUN/Creatinine Ratio 11 (6-20)  


 


Glucose Level


 111 mg/dL


(70-99) 





 


Lactic Acid Level


 2.6 mmol/L


(0.4-2.0) 1.4 mmol/L


(0.4-2.0)


 


Uric Acid


 11.0 mg/dL


(3.5-7.2) 





 


Calcium Level


 10.1 mg/dL


(8.5-10.1) 





 


Total Bilirubin


 0.8 mg/dL


(0.2-1.0) 





 


Aspartate Amino Transf


(AST/SGOT) 9 U/L (15-37) 


 





 


Alanine Aminotransferase


(ALT/SGPT) 10 U/L (16-63) 


 





 


Alkaline Phosphatase


 107 U/L


() 





 


Troponin I High Sensitivity 7 ng/L (4-75)  


 


Total Protein


 6.9 g/dL


(6.4-8.2) 





 


Albumin


 2.6 g/dL


(3.4-5.0) 





 


Albumin/Globulin Ratio 0.6 (1.0-1.7)  











Images


Images


Reviewed x-rays of the left hand that were taken from the emergency room.





Assessment/Plan


Assessment/Plan


Left hand pain and swelling with erythema, his whole clinical picture supports 

infection.


Tobacco abuse


Recent hospitalization for stomach ulcer


Alcohol abuse


Hypertension


Tachycardia since he has been to the hospital





I discussed the patient's clinical presentation and exam with Dr. Estrada who 

agrees to get an MRI of the left hand today.  We will review that once it is 

available to make sure there is no extension into the flexor surface as far as 

the infection goes.  There was no fluctuance or specific abscess seen on exam 

but the MRI will help us differentiate that as well.  We will keep him n.p.o. 

after midnight and will see him tomorrow morning.  We have consulted infectious 

disease.  We have ordered serial lab work to follow as well.  Hopefully, we will

not have to intervene surgically but will have more information after his MRI is

done.  His plan was communicated with his nurse and she will pass it along in 

report to the nurses tonight.  Please call orthopedics if the patient worsens.











JULIET SINGH          2022 17:17

## 2022-04-07 NOTE — PDOC1
History and Physical


Date of Admission


Date of Admission


DATE: 4/7/22 


TIME: 14:48





Source


Source:  Chart review, Patient





History of Present Illness


History of Present Illness


 Mr. Bernal,  is a 56-year-old male admit with severe right hand and wrist pain.

 HAs known gout, takes no meds, 


He reports he was admitted to [facility] on February 28, 2022 after syncopal 

episode and patient was found to have a bleeding ulcer  transfusions x2 units 

but was not D C onany new meds.s


he compplains of leg weakness and would like an external cath placed,    I asked

him to try PT.


He follows with  Dr. Hamilton and his medications were adjusted due to his low bloo

d pressures.


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





Past Medical History


Cardiovascular:  HTN


Pulmonary:  No pertinent hx


GI:  No pertinent hx


Heme/Onc:  No pertinent hx


Psych:  No pertinent hx


Musculoskeletal:   low back pain


Infectious disease:  No pertinent hx


Renal/:  No pertinent hx





Past Surgical History


Past Surgical History:  Other





Family History


Family History:  Hypertension





Social History


Smoke:  No


ALCOHOL:  none


Drugs:  None





Current Problem List


Problem List


Problems


Medical Problems:


(1) Cellulitis


Status: Acute  











Current Medications


Current Medications





Current Medications


Sodium Chloride 1,000 ml @  999 mls/hr 1X  ONCE IV  Last administered on 

4/7/22at 12:28;  Start 4/7/22 at 12:15;  Stop 4/7/22 at 13:15;  Status DC


Fentanyl Citrate (Fentanyl 2ml Vial) 50 mcg 1X  ONCE IVP  Last administered on 

4/7/22at 12:29;  Start 4/7/22 at 12:45;  Stop 4/7/22 at 12:46;  Status DC


Vancomycin HCl (Vanco Per Pharmacy) 1 each PRN DAILY  PRN MC SEE COMMENTS;  

Start 4/7/22 at 12:45


Vancomycin HCl 1.25 gm/Sodium Chloride 250 ml @  166.667 mls/hr 1X  ONCE IV  

Last administered on 4/7/22at 13:12;  Start 4/7/22 at 13:00;  Stop 4/7/22 at 

14:29;  Status DC


Fentanyl Citrate (Fentanyl 2ml Vial) 50 mcg PRN Q2HR  PRN IVP PAIN;  Start 

4/7/22 at 13:00;  Stop 4/8/22 at 12:59


Febuxostat (Uloric) 40 mg DAILY PO ;  Start 4/7/22 at 15:00





Active Scripts


Active


Reported


Aspir-Low (Aspirin) 81 Mg Tablet.dr 1 Tab PO DAILY


Indomethacin 50 Mg Capsule 1 Cap PO TID PRN


Metoprolol Succinate 50 Mg Tab.er.24h 100 Mg PO DAILY





Allergies


Allergies:  


Coded Allergies:  


     No Known Drug Allergies (Unverified , 6/30/20)





ROS


General:  No: Chills, Night Sweats, Fatigue, Malaise, Appetite, Other


PSYCHOLOGICAL ROS:  No: Anxiety, Behavioral Disorder, Concentration difficultie,

Decreased libido, Depression, Disorientation, Hallucinations, Hostility, 

Irritablity, Memory difficulties, Mood Swings, Obsessive thoughts, Physical 

abuse, Sexual abuse, Sleep disturbances, Suicidal ideation, Other


Eyes:  No Blurry vision, No Decreased vision, No Double vision, No Dry eyes, No 

Excessive tearing, No Eye Pain, No Itchy Eyes, No Loss of vision, No 

Photophobia, No Scotomata, No Uses contacts, No Uses glasses, No Other


HEENT:  No: Heacaches, Visual Changes, Hearing change, Nasal congestion, Nasal 

discharge, Oral lesions, Sinus pain, Sore Throat, Epistaxis, Sneezing, Snoring, 

Tinnitus, Vertigo, Vocal changes, Other


Respiratory:  No: Cough, Hemoptysis, Orthopnea, Pleuritic Pain, Shortness of 

breath, SOB with excertion, Sputum Changes, Stridor, Tachypnea, Wheezing, Other


Cardiovascular:  No Chest Pain, No Palpitations, No Orthopnea, No Paroxysmal 

Noc. Dyspnea, No Edema, No Lt Headedness, No Other


Gastrointestinal:  No Nausea, No Vomiting, No Abdominal Pain, No Diarrhea, No 

Constipation, No Melena, No Hematochezia, No Other


Genitourinary:  No Dysuria, No Frequency, No Incontinence, No Hematuria, No 

Retention, No Discharge, No Urgency, No Pain, No Flank Pain, No Other, No , No ,

No , No , No , No , No 


Musculoskeletal:  Yes Gait Disturbance, Yes Joint Stiffness, Yes Muscular 

Weakness (legs), Yes Pain In: (left wrist,  balls of feet, ); 


   No Joint Pain, No Joint Swelling, No Muscle Pain, No Swelling In:, No Other


Neurological:  No Behavorial Changes, No Bowel/Bladder ControlChng, No 

Confusion, No Dizziness, No Gait Disturbance, No Headaches, No Impaired 

Coord/balance, No Memory Loss, No Numbness/Tingling, No Seizures, No Speech 

Problems, No Tremors, No Visual Changes, No Weakness, No Other


Skin:  Yes Eczema, Yes Rash; 


   No Dry Skin, No Hair Changes, No Lumps, No Mole Changes, No Mottling, No Nail

Changes, No Pruritus, No Skin Lesion Changes, No Other, No Acne





Physical Exam


General:  Alert, Oriented X3


HEENT:  Atraumatic, PERRLA


Lungs:  Clear to auscultation


Heart:  S1S2


Abdomen:  Soft, No tenderness, Other (scars from 3 prior surg,  2 prior ostomy 

placements)


Rectal Exam:  not examined


Extremities:  Normal pulses, Other (left hand red, swollen,   poor ROM wrist, )


Skin:  No breakdown, Other (redness marked ot left wrist,  splotchy,   blanches)


Neuro:  Normal speech, Sensation intact


Psych/Mental Status:  Mental status NL, Mood NL





Vitals


Vitals





Vital Signs








  Date Time  Temp Pulse Resp B/P (MAP) Pulse Ox O2 Delivery O2 Flow Rate FiO2


 


4/7/22 12:30  116 18 113/84 (94) 100 Room Air  


 


4/7/22 10:06 98.9       





 98.9       











Labs


Labs





Laboratory Tests








Test


 4/7/22


11:14


 


White Blood Count


 10.4 x10^3/uL


(4.0-11.0)


 


Red Blood Count


 3.08 x10^6/uL


(4.30-5.70)


 


Hemoglobin


 9.0 g/dL


(13.0-17.5)


 


Hematocrit


 27.4 %


(39.0-53.0)


 


Mean Corpuscular Volume 89 fL () 


 


Mean Corpuscular Hemoglobin 29 pg (25-35) 


 


Mean Corpuscular Hemoglobin


Concent 33 g/dL


(31-37)


 


Red Cell Distribution Width


 18.6 %


(11.5-14.5)


 


Platelet Count


 262 x10^3/uL


(140-400)


 


Neutrophils (%) (Auto) 84 % (31-73) 


 


Lymphocytes (%) (Auto) 8 % (24-48) 


 


Monocytes (%) (Auto) 7 % (0-9) 


 


Eosinophils (%) (Auto) 1 % (0-3) 


 


Basophils (%) (Auto) 1 % (0-3) 


 


Neutrophils # (Auto)


 8.7 x10^3/uL


(1.8-7.7)


 


Lymphocytes # (Auto)


 0.8 x10^3/uL


(1.0-4.8)


 


Monocytes # (Auto)


 0.7 x10^3/uL


(0.0-1.1)


 


Eosinophils # (Auto)


 0.1 x10^3/uL


(0.0-0.7)


 


Basophils # (Auto)


 0.1 x10^3/uL


(0.0-0.2)


 


Sodium Level


 137 mmol/L


(136-145)


 


Potassium Level


 4.0 mmol/L


(3.5-5.1)


 


Chloride Level


 101 mmol/L


()


 


Carbon Dioxide Level


 22 mmol/L


(21-32)


 


Anion Gap 14 (6-14) 


 


Blood Urea Nitrogen


 19 mg/dL


(8-26)


 


Creatinine


 1.7 mg/dL


(0.7-1.3)


 


Estimated GFR


(Cockcroft-Gault) 41.6 





 


BUN/Creatinine Ratio 11 (6-20) 


 


Glucose Level


 111 mg/dL


(70-99)


 


Lactic Acid Level


 2.6 mmol/L


(0.4-2.0)


 


Uric Acid


 11.0 mg/dL


(3.5-7.2)


 


Calcium Level


 10.1 mg/dL


(8.5-10.1)


 


Total Bilirubin


 0.8 mg/dL


(0.2-1.0)


 


Aspartate Amino Transf


(AST/SGOT) 9 U/L (15-37) 





 


Alanine Aminotransferase


(ALT/SGPT) 10 U/L (16-63) 





 


Alkaline Phosphatase


 107 U/L


()


 


Troponin I High Sensitivity 7 ng/L (4-75) 


 


Total Protein


 6.9 g/dL


(6.4-8.2)


 


Albumin


 2.6 g/dL


(3.4-5.0)


 


Albumin/Globulin Ratio 0.6 (1.0-1.7) 








Laboratory Tests








Test


 4/7/22


11:14


 


White Blood Count


 10.4 x10^3/uL


(4.0-11.0)


 


Red Blood Count


 3.08 x10^6/uL


(4.30-5.70)


 


Hemoglobin


 9.0 g/dL


(13.0-17.5)


 


Hematocrit


 27.4 %


(39.0-53.0)


 


Mean Corpuscular Volume 89 fL () 


 


Mean Corpuscular Hemoglobin 29 pg (25-35) 


 


Mean Corpuscular Hemoglobin


Concent 33 g/dL


(31-37)


 


Red Cell Distribution Width


 18.6 %


(11.5-14.5)


 


Platelet Count


 262 x10^3/uL


(140-400)


 


Neutrophils (%) (Auto) 84 % (31-73) 


 


Lymphocytes (%) (Auto) 8 % (24-48) 


 


Monocytes (%) (Auto) 7 % (0-9) 


 


Eosinophils (%) (Auto) 1 % (0-3) 


 


Basophils (%) (Auto) 1 % (0-3) 


 


Neutrophils # (Auto)


 8.7 x10^3/uL


(1.8-7.7)


 


Lymphocytes # (Auto)


 0.8 x10^3/uL


(1.0-4.8)


 


Monocytes # (Auto)


 0.7 x10^3/uL


(0.0-1.1)


 


Eosinophils # (Auto)


 0.1 x10^3/uL


(0.0-0.7)


 


Basophils # (Auto)


 0.1 x10^3/uL


(0.0-0.2)


 


Sodium Level


 137 mmol/L


(136-145)


 


Potassium Level


 4.0 mmol/L


(3.5-5.1)


 


Chloride Level


 101 mmol/L


()


 


Carbon Dioxide Level


 22 mmol/L


(21-32)


 


Anion Gap 14 (6-14) 


 


Blood Urea Nitrogen


 19 mg/dL


(8-26)


 


Creatinine


 1.7 mg/dL


(0.7-1.3)


 


Estimated GFR


(Cockcroft-Gault) 41.6 





 


BUN/Creatinine Ratio 11 (6-20) 


 


Glucose Level


 111 mg/dL


(70-99)


 


Lactic Acid Level


 2.6 mmol/L


(0.4-2.0)


 


Uric Acid


 11.0 mg/dL


(3.5-7.2)


 


Calcium Level


 10.1 mg/dL


(8.5-10.1)


 


Total Bilirubin


 0.8 mg/dL


(0.2-1.0)


 


Aspartate Amino Transf


(AST/SGOT) 9 U/L (15-37) 





 


Alanine Aminotransferase


(ALT/SGPT) 10 U/L (16-63) 





 


Alkaline Phosphatase


 107 U/L


()


 


Troponin I High Sensitivity 7 ng/L (4-75) 


 


Total Protein


 6.9 g/dL


(6.4-8.2)


 


Albumin


 2.6 g/dL


(3.4-5.0)


 


Albumin/Globulin Ratio 0.6 (1.0-1.7) 











VTE Prophylaxis Ordered


VTE Prophylaxis Devices:  No


VTE Pharmacological Prophylaxi:  Yes





Assessment/Plan


Assessment/Plan


left hand and wrist celluliit,s  IV vanco


Hx GOUT<  uric acid high, consult ortho, prob may have startd with gout,      

Give cochicine and uloric, 


Htn, metoprolol,   would wean off, not best choice for monotherapy,.





leg weakness, PT and OT 


CKD 3


mod/severe malnutrition,   prior gut surgeries,   perforations,       probable 

short gut, but he denies





Justifications for Admission


Other Justification














MARISA NOE MD                  Apr 7, 2022 14:57

## 2022-04-07 NOTE — RAD
EXAM:  PA, oblique and lateral views left hand



DATE: 4/7/2022 10:40 AM



INDICATION: Reason: hand swelling. HX OF GOUT / Spl. Instructions:  / History: .



COMPARISON: No Prior



FINDINGS:

No evidence of acute fracture or dislocation. Diffuse soft tissue swelling about the left long, index
, and ring fingers. Advanced thumb CMC DJD. Scattered IP joint degenerative changes. Small juxta-eri
cular erosion is suspected at the distal phalanx left index finger. Old/healed fracture fifth metacar
pal shaft. Atherosclerotic vascular calcifications are seen.



IMPRESSION:

No acute fracture or dislocation.

Diffuse soft tissue swelling about the digits as well as the extra articular erosion at the middle ph
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 



Electronically signed by: Shakeel Thompson MD (4/7/2022 11:54 AM) [id number]

## 2022-04-08 VITALS — DIASTOLIC BLOOD PRESSURE: 81 MMHG | SYSTOLIC BLOOD PRESSURE: 108 MMHG

## 2022-04-08 VITALS — DIASTOLIC BLOOD PRESSURE: 78 MMHG | SYSTOLIC BLOOD PRESSURE: 106 MMHG

## 2022-04-08 VITALS — DIASTOLIC BLOOD PRESSURE: 82 MMHG | SYSTOLIC BLOOD PRESSURE: 114 MMHG

## 2022-04-08 VITALS — DIASTOLIC BLOOD PRESSURE: 76 MMHG | SYSTOLIC BLOOD PRESSURE: 117 MMHG

## 2022-04-08 VITALS — SYSTOLIC BLOOD PRESSURE: 121 MMHG | DIASTOLIC BLOOD PRESSURE: 76 MMHG

## 2022-04-08 VITALS — DIASTOLIC BLOOD PRESSURE: 80 MMHG | SYSTOLIC BLOOD PRESSURE: 124 MMHG

## 2022-04-08 LAB
ALBUMIN SERPL-MCNC: 1.9 G/DL (ref 3.4–5)
ALBUMIN/GLOB SERPL: 0.5 {RATIO} (ref 1–1.7)
ALP SERPL-CCNC: 79 U/L (ref 46–116)
ALT SERPL-CCNC: 6 U/L (ref 16–63)
ANION GAP SERPL CALC-SCNC: 10 MMOL/L (ref 6–14)
AST SERPL-CCNC: 7 U/L (ref 15–37)
BASOPHILS # BLD AUTO: 0 X10^3/UL (ref 0–0.2)
BASOPHILS NFR BLD: 1 % (ref 0–3)
BILIRUB SERPL-MCNC: 0.5 MG/DL (ref 0.2–1)
BUN SERPL-MCNC: 13 MG/DL (ref 8–26)
BUN/CREAT SERPL: 9 (ref 6–20)
CALCIUM SERPL-MCNC: 9 MG/DL (ref 8.5–10.1)
CHLORIDE SERPL-SCNC: 108 MMOL/L (ref 98–107)
CO2 SERPL-SCNC: 20 MMOL/L (ref 21–32)
CREAT SERPL-MCNC: 1.4 MG/DL (ref 0.7–1.3)
EOSINOPHIL NFR BLD: 0.2 X10^3/UL (ref 0–0.7)
EOSINOPHIL NFR BLD: 2 % (ref 0–3)
ERYTHROCYTE [DISTWIDTH] IN BLOOD BY AUTOMATED COUNT: 18.7 % (ref 11.5–14.5)
GFR SERPLBLD BASED ON 1.73 SQ M-ARVRAT: 52.1 ML/MIN
GLUCOSE SERPL-MCNC: 101 MG/DL (ref 70–99)
HCT VFR BLD CALC: 22.9 % (ref 39–53)
HGB BLD-MCNC: 7.5 G/DL (ref 13–17.5)
LYMPHOCYTES # BLD: 0.8 X10^3/UL (ref 1–4.8)
LYMPHOCYTES NFR BLD AUTO: 10 % (ref 24–48)
MCH RBC QN AUTO: 29 PG (ref 25–35)
MCHC RBC AUTO-ENTMCNC: 33 G/DL (ref 31–37)
MCV RBC AUTO: 89 FL (ref 79–100)
MONO #: 0.7 X10^3/UL (ref 0–1.1)
MONOCYTES NFR BLD: 8 % (ref 0–9)
NEUT #: 6.3 X10^3/UL (ref 1.8–7.7)
NEUTROPHILS NFR BLD AUTO: 79 % (ref 31–73)
PLATELET # BLD AUTO: 218 X10^3/UL (ref 140–400)
POTASSIUM SERPL-SCNC: 3.5 MMOL/L (ref 3.5–5.1)
PROT SERPL-MCNC: 6.1 G/DL (ref 6.4–8.2)
RBC # BLD AUTO: 2.58 X10^6/UL (ref 4.3–5.7)
SODIUM SERPL-SCNC: 138 MMOL/L (ref 136–145)
WBC # BLD AUTO: 8 X10^3/UL (ref 4–11)

## 2022-04-08 RX ADMIN — PIPERACILLIN SODIUM AND TAZOBACTAM SODIUM SCH MLS/HR: 3; .375 INJECTION, POWDER, LYOPHILIZED, FOR SOLUTION INTRAVENOUS at 17:50

## 2022-04-08 RX ADMIN — METOPROLOL SUCCINATE SCH MG: 50 TABLET, EXTENDED RELEASE ORAL at 08:53

## 2022-04-08 RX ADMIN — MINERAL SUPPLEMENT IRON 300 MG / 5 ML STRENGTH LIQUID 100 PER BOX UNFLAVORED SCH MG: at 17:52

## 2022-04-08 RX ADMIN — FAMOTIDINE SCH MG: 20 TABLET ORAL at 21:25

## 2022-04-08 RX ADMIN — Medication SCH TAB: at 08:51

## 2022-04-08 RX ADMIN — VANCOMYCIN HYDROCHLORIDE PRN EACH: 1 INJECTION, POWDER, LYOPHILIZED, FOR SOLUTION INTRAVENOUS at 15:33

## 2022-04-08 RX ADMIN — FEBUXOSTAT SCH MG: 40 TABLET ORAL at 08:52

## 2022-04-08 RX ADMIN — ENOXAPARIN SODIUM SCH MG: 40 INJECTION SUBCUTANEOUS at 17:53

## 2022-04-08 RX ADMIN — DAPTOMYCIN SCH MLS/HR: 500 INJECTION, POWDER, LYOPHILIZED, FOR SOLUTION INTRAVENOUS at 17:57

## 2022-04-08 RX ADMIN — COLCHICINE SCH MG: 0.6 TABLET, FILM COATED ORAL at 08:52

## 2022-04-08 NOTE — PDOC
[facility] PROGRESS NOTE


Date of Service


DOS:


DATE: 4/8/22 


TIME: 12:19





Chief Complaint


Chief Complaint


left hand and wrist celluliit,s  IV vanco


Hx GOUT<  uric acid high, consult ortho, prob may have startd with gout,      

Give cochicine and uloric, 


Htn, metoprolol,   would wean off, not best choice for monotherapy,.





leg weakness, PT and OT 


CKD 3


mod/severe malnutrition,   prior gut surgeries,   perforations,       probable 

short gut, but he denies





History of Present Illness


History of Present Illness


4/8


Seen at bedside, pain improving but still definitely present.  As needed pain 

control. ID consult.  Continue gout treatment.





Vitals/I&O


Vitals/I&O:





                                   Vital Signs








  Date Time  Temp Pulse Resp B/P (MAP) Pulse Ox O2 Delivery O2 Flow Rate FiO2


 


4/8/22 10:38 99.6 97 18 114/82 (93) 96 Room Air  





 99.6       














                                    I & O   


 


 4/7/22 4/7/22 4/8/22





 15:00 23:00 07:00


 


Intake Total 1000 ml  240 ml


 


Output Total  300 ml 


 


Balance 1000 ml -300 ml 240 ml











Physical Exam


General:  Alert, Oriented X3


Heart:  Regular rate, Normal S1, Normal S2


Lungs:  Clear


Abdomen:  Soft, No tenderness, Other (scars from 3 prior surg,  2 prior ostomy 

placements)


Extremities:  Normal pulses, Other (left hand red, swollen,   poor ROM wrist, )


Skin:  No breakdown, Other (redness marked ot left wrist,  splotchy,   blanches)





Labs


Labs:





Laboratory Tests








Test


 4/7/22


15:39 4/7/22


21:30 4/8/22


05:45


 


Lactic Acid Level


 1.4 mmol/L


(0.4-2.0) 0.6 mmol/L


(0.4-2.0) 





 


Prothrombin Time


 


 15.0 SEC


(11.7-14.0) 





 


Prothromb Time International


Ratio 


 1.2 (0.8-1.1) 


 





 


Activated Partial


Thromboplast Time 


 47 SEC (24-38) 


 





 


White Blood Count


 


 


 8.0 x10^3/uL


(4.0-11.0)


 


Red Blood Count


 


 


 2.58 x10^6/uL


(4.30-5.70)


 


Hemoglobin


 


 


 7.5 g/dL


(13.0-17.5)


 


Hematocrit


 


 


 22.9 %


(39.0-53.0)


 


Mean Corpuscular Volume   89 fL () 


 


Mean Corpuscular Hemoglobin   29 pg (25-35) 


 


Mean Corpuscular Hemoglobin


Concent 


 


 33 g/dL


(31-37)


 


Red Cell Distribution Width


 


 


 18.7 %


(11.5-14.5)


 


Platelet Count


 


 


 218 x10^3/uL


(140-400)


 


Neutrophils (%) (Auto)   79 % (31-73) 


 


Lymphocytes (%) (Auto)   10 % (24-48) 


 


Monocytes (%) (Auto)   8 % (0-9) 


 


Eosinophils (%) (Auto)   2 % (0-3) 


 


Basophils (%) (Auto)   1 % (0-3) 


 


Neutrophils # (Auto)


 


 


 6.3 x10^3/uL


(1.8-7.7)


 


Lymphocytes # (Auto)


 


 


 0.8 x10^3/uL


(1.0-4.8)


 


Monocytes # (Auto)


 


 


 0.7 x10^3/uL


(0.0-1.1)


 


Eosinophils # (Auto)


 


 


 0.2 x10^3/uL


(0.0-0.7)


 


Basophils # (Auto)


 


 


 0.0 x10^3/uL


(0.0-0.2)


 


Erythrocyte Sedimentation Rate   > 130 (0-15) 


 


Sodium Level


 


 


 138 mmol/L


(136-145)


 


Potassium Level


 


 


 3.5 mmol/L


(3.5-5.1)


 


Chloride Level


 


 


 108 mmol/L


()


 


Carbon Dioxide Level


 


 


 20 mmol/L


(21-32)


 


Anion Gap   10 (6-14) 


 


Blood Urea Nitrogen


 


 


 13 mg/dL


(8-26)


 


Creatinine


 


 


 1.4 mg/dL


(0.7-1.3)


 


Estimated GFR


(Cockcroft-Gault) 


 


 52.1 





 


BUN/Creatinine Ratio   9 (6-20) 


 


Glucose Level


 


 


 101 mg/dL


(70-99)


 


Calcium Level


 


 


 9.0 mg/dL


(8.5-10.1)


 


Total Bilirubin


 


 


 0.5 mg/dL


(0.2-1.0)


 


Aspartate Amino Transf


(AST/SGOT) 


 


 7 U/L (15-37) 





 


Alanine Aminotransferase


(ALT/SGPT) 


 


 6 U/L (16-63) 





 


Alkaline Phosphatase


 


 


 79 U/L


()


 


C-Reactive Protein,


Quantitative 


 


 195.5 mg/L


(0-3.3)


 


Total Protein


 


 


 6.1 g/dL


(6.4-8.2)


 


Albumin


 


 


 1.9 g/dL


(3.4-5.0)


 


Albumin/Globulin Ratio   0.5 (1.0-1.7) 











Assessment and Plan


Assessmemt and Plan


Problems


Medical Problems:


(1) Cellulitis


Status: Acute  











Comment


Review of Relevant


I have reviewed the following items freddy (where applicable) has been applied.


Medications:





Current Medications








 Medications


  (Trade)  Dose


 Ordered  Sig/Kim


 Route


 PRN Reason  Start Time


 Stop Time Status Last Admin


Dose Admin


 


 Fentanyl Citrate


  (Fentanyl 2ml


 Vial)  50 mcg  1X  ONCE


 IVP


   4/7/22 12:45


 4/7/22 12:46 DC 4/7/22 12:29





 


 Vancomycin HCl


  (Vanco Per


 Pharmacy)  1 each  PRN DAILY  PRN


 [facility]


 SEE COMMENTS  4/7/22 12:45


    4/7/22 15:01





 


 Vancomycin HCl


 1.25 gm/Sodium


 Chloride  250 ml @ 


 166.667


 mls/hr  1X  ONCE


 IV


   4/7/22 13:00


 4/7/22 14:29 DC 4/7/22 13:12





 


 Febuxostat


  (Uloric)  40 mg  DAILY


 PO


   4/7/22 15:00


    4/8/22 08:52





 


 Colchicine


  (Colcrys)  0.6 mg  1X  ONCE


 PO


   4/7/22 15:00


 4/7/22 15:01 DC 4/7/22 15:39





 


 Colchicine


  (Colcrys)  0.6 mg  DAILY


 PO


   4/8/22 09:00


    4/8/22 08:52





 


 Famotidine


  (Pepcid)  20 mg  QHS


 PO


   4/7/22 21:00


    4/7/22 21:32





 


 Metoprolol


 Succinate


  (Toprol Xl)  50 mg  DAILY


 PO


   4/8/22 09:00


    4/8/22 08:53





 


 Sodium Chloride  1,000 ml @ 


 125 mls/hr  1X  ONCE


 IV


   4/7/22 15:00


 4/7/22 22:59 DC 4/7/22 15:40





 


 Sodium Chloride  1,000 ml @ 


 1,000 mls/hr  1X  ONCE


 IV


   4/7/22 15:00


 4/7/22 15:59 DC 4/7/22 15:40





 


 Ferrous Sulfate


  (Iron Oral


 Solution)  300 mg  DAILYBFRSUP


 PO


   4/7/22 17:00


    4/7/22 16:29





 


 Acetaminophen


  (Tylenol)  650 mg  PRN Q6HRS  PRN


 PO


 MILD PAIN / TEMP > 100.3'F  4/7/22 16:15


    4/7/22 16:30














Justifications for Admission


Other Justification














ANDREE WEISS MD          Apr 8, 2022 12:21

## 2022-04-08 NOTE — PDOC
PROGRESS NOTES


Date of Service


DATE: 4/8/22 


TIME: 09:32





Subjective


Subjective


Problems overnight:





Objective


Vital Signs





Vital Signs








  Date Time  Temp Pulse Resp B/P (MAP) Pulse Ox O2 Delivery O2 Flow Rate FiO2


 


4/8/22 08:53  107  108/81    


 


4/8/22 08:00      Room Air  


 


4/8/22 07:00 99.4  18  97   





 99.4       








Labs





Laboratory Tests








Test


 4/7/22


11:14 4/7/22


15:39 4/7/22


21:30 4/8/22


05:45


 


White Blood Count


 10.4 x10^3/uL


(4.0-11.0) 


 


 8.0 x10^3/uL


(4.0-11.0)


 


Red Blood Count


 3.08 x10^6/uL


(4.30-5.70) 


 


 2.58 x10^6/uL


(4.30-5.70)


 


Hemoglobin


 9.0 g/dL


(13.0-17.5) 


 


 7.5 g/dL


(13.0-17.5)


 


Hematocrit


 27.4 %


(39.0-53.0) 


 


 22.9 %


(39.0-53.0)


 


Mean Corpuscular Volume 89 fL ()    89 fL () 


 


Mean Corpuscular Hemoglobin 29 pg (25-35)    29 pg (25-35) 


 


Mean Corpuscular Hemoglobin


Concent 33 g/dL


(31-37) 


 


 33 g/dL


(31-37)


 


Red Cell Distribution Width


 18.6 %


(11.5-14.5) 


 


 18.7 %


(11.5-14.5)


 


Platelet Count


 262 x10^3/uL


(140-400) 


 


 218 x10^3/uL


(140-400)


 


Neutrophils (%) (Auto) 84 % (31-73)    79 % (31-73) 


 


Lymphocytes (%) (Auto) 8 % (24-48)    10 % (24-48) 


 


Monocytes (%) (Auto) 7 % (0-9)    8 % (0-9) 


 


Eosinophils (%) (Auto) 1 % (0-3)    2 % (0-3) 


 


Basophils (%) (Auto) 1 % (0-3)    1 % (0-3) 


 


Neutrophils # (Auto)


 8.7 x10^3/uL


(1.8-7.7) 


 


 6.3 x10^3/uL


(1.8-7.7)


 


Lymphocytes # (Auto)


 0.8 x10^3/uL


(1.0-4.8) 


 


 0.8 x10^3/uL


(1.0-4.8)


 


Monocytes # (Auto)


 0.7 x10^3/uL


(0.0-1.1) 


 


 0.7 x10^3/uL


(0.0-1.1)


 


Eosinophils # (Auto)


 0.1 x10^3/uL


(0.0-0.7) 


 


 0.2 x10^3/uL


(0.0-0.7)


 


Basophils # (Auto)


 0.1 x10^3/uL


(0.0-0.2) 


 


 0.0 x10^3/uL


(0.0-0.2)


 


Sodium Level


 137 mmol/L


(136-145) 


 


 138 mmol/L


(136-145)


 


Potassium Level


 4.0 mmol/L


(3.5-5.1) 


 


 3.5 mmol/L


(3.5-5.1)


 


Chloride Level


 101 mmol/L


() 


 


 108 mmol/L


()


 


Carbon Dioxide Level


 22 mmol/L


(21-32) 


 


 20 mmol/L


(21-32)


 


Anion Gap 14 (6-14)    10 (6-14) 


 


Blood Urea Nitrogen


 19 mg/dL


(8-26) 


 


 13 mg/dL


(8-26)


 


Creatinine


 1.7 mg/dL


(0.7-1.3) 


 


 1.4 mg/dL


(0.7-1.3)


 


Estimated GFR


(Cockcroft-Gault) 41.6 


 


 


 52.1 





 


BUN/Creatinine Ratio 11 (6-20)    9 (6-20) 


 


Glucose Level


 111 mg/dL


(70-99) 


 


 101 mg/dL


(70-99)


 


Lactic Acid Level


 2.6 mmol/L


(0.4-2.0) 1.4 mmol/L


(0.4-2.0) 0.6 mmol/L


(0.4-2.0) 





 


Uric Acid


 11.0 mg/dL


(3.5-7.2) 


 


 





 


Calcium Level


 10.1 mg/dL


(8.5-10.1) 


 


 9.0 mg/dL


(8.5-10.1)


 


Total Bilirubin


 0.8 mg/dL


(0.2-1.0) 


 


 0.5 mg/dL


(0.2-1.0)


 


Aspartate Amino Transf


(AST/SGOT) 9 U/L (15-37) 


 


 


 7 U/L (15-37) 





 


Alanine Aminotransferase


(ALT/SGPT) 10 U/L (16-63) 


 


 


 6 U/L (16-63) 





 


Alkaline Phosphatase


 107 U/L


() 


 


 79 U/L


()


 


Troponin I High Sensitivity 7 ng/L (4-75)    


 


Total Protein


 6.9 g/dL


(6.4-8.2) 


 


 6.1 g/dL


(6.4-8.2)


 


Albumin


 2.6 g/dL


(3.4-5.0) 


 


 1.9 g/dL


(3.4-5.0)


 


Albumin/Globulin Ratio 0.6 (1.0-1.7)    0.5 (1.0-1.7) 


 


Prothrombin Time


 


 


 15.0 SEC


(11.7-14.0) 





 


Prothromb Time International


Ratio 


 


 1.2 (0.8-1.1) 


 





 


Activated Partial


Thromboplast Time 


 


 47 SEC (24-38) 


 





 


Erythrocyte Sedimentation Rate    > 130 (0-15) 


 


C-Reactive Protein,


Quantitative 


 


 


 195.5 mg/L


(0-3.3)








Laboratory Tests








Test


 4/7/22


11:14 4/7/22


15:39 4/7/22


21:30 4/8/22


05:45


 


White Blood Count


 10.4 x10^3/uL


(4.0-11.0) 


 


 8.0 x10^3/uL


(4.0-11.0)


 


Red Blood Count


 3.08 x10^6/uL


(4.30-5.70) 


 


 2.58 x10^6/uL


(4.30-5.70)


 


Hemoglobin


 9.0 g/dL


(13.0-17.5) 


 


 7.5 g/dL


(13.0-17.5)


 


Hematocrit


 27.4 %


(39.0-53.0) 


 


 22.9 %


(39.0-53.0)


 


Mean Corpuscular Volume 89 fL ()    89 fL () 


 


Mean Corpuscular Hemoglobin 29 pg (25-35)    29 pg (25-35) 


 


Mean Corpuscular Hemoglobin


Concent 33 g/dL


(31-37) 


 


 33 g/dL


(31-37)


 


Red Cell Distribution Width


 18.6 %


(11.5-14.5) 


 


 18.7 %


(11.5-14.5)


 


Platelet Count


 262 x10^3/uL


(140-400) 


 


 218 x10^3/uL


(140-400)


 


Neutrophils (%) (Auto) 84 % (31-73)    79 % (31-73) 


 


Lymphocytes (%) (Auto) 8 % (24-48)    10 % (24-48) 


 


Monocytes (%) (Auto) 7 % (0-9)    8 % (0-9) 


 


Eosinophils (%) (Auto) 1 % (0-3)    2 % (0-3) 


 


Basophils (%) (Auto) 1 % (0-3)    1 % (0-3) 


 


Neutrophils # (Auto)


 8.7 x10^3/uL


(1.8-7.7) 


 


 6.3 x10^3/uL


(1.8-7.7)


 


Lymphocytes # (Auto)


 0.8 x10^3/uL


(1.0-4.8) 


 


 0.8 x10^3/uL


(1.0-4.8)


 


Monocytes # (Auto)


 0.7 x10^3/uL


(0.0-1.1) 


 


 0.7 x10^3/uL


(0.0-1.1)


 


Eosinophils # (Auto)


 0.1 x10^3/uL


(0.0-0.7) 


 


 0.2 x10^3/uL


(0.0-0.7)


 


Basophils # (Auto)


 0.1 x10^3/uL


(0.0-0.2) 


 


 0.0 x10^3/uL


(0.0-0.2)


 


Sodium Level


 137 mmol/L


(136-145) 


 


 138 mmol/L


(136-145)


 


Potassium Level


 4.0 mmol/L


(3.5-5.1) 


 


 3.5 mmol/L


(3.5-5.1)


 


Chloride Level


 101 mmol/L


() 


 


 108 mmol/L


()


 


Carbon Dioxide Level


 22 mmol/L


(21-32) 


 


 20 mmol/L


(21-32)


 


Anion Gap 14 (6-14)    10 (6-14) 


 


Blood Urea Nitrogen


 19 mg/dL


(8-26) 


 


 13 mg/dL


(8-26)


 


Creatinine


 1.7 mg/dL


(0.7-1.3) 


 


 1.4 mg/dL


(0.7-1.3)


 


Estimated GFR


(Cockcroft-Gault) 41.6 


 


 


 52.1 





 


BUN/Creatinine Ratio 11 (6-20)    9 (6-20) 


 


Glucose Level


 111 mg/dL


(70-99) 


 


 101 mg/dL


(70-99)


 


Lactic Acid Level


 2.6 mmol/L


(0.4-2.0) 1.4 mmol/L


(0.4-2.0) 0.6 mmol/L


(0.4-2.0) 





 


Uric Acid


 11.0 mg/dL


(3.5-7.2) 


 


 





 


Calcium Level


 10.1 mg/dL


(8.5-10.1) 


 


 9.0 mg/dL


(8.5-10.1)


 


Total Bilirubin


 0.8 mg/dL


(0.2-1.0) 


 


 0.5 mg/dL


(0.2-1.0)


 


Aspartate Amino Transf


(AST/SGOT) 9 U/L (15-37) 


 


 


 7 U/L (15-37) 





 


Alanine Aminotransferase


(ALT/SGPT) 10 U/L (16-63) 


 


 


 6 U/L (16-63) 





 


Alkaline Phosphatase


 107 U/L


() 


 


 79 U/L


()


 


Troponin I High Sensitivity 7 ng/L (4-75)    


 


Total Protein


 6.9 g/dL


(6.4-8.2) 


 


 6.1 g/dL


(6.4-8.2)


 


Albumin


 2.6 g/dL


(3.4-5.0) 


 


 1.9 g/dL


(3.4-5.0)


 


Albumin/Globulin Ratio 0.6 (1.0-1.7)    0.5 (1.0-1.7) 


 


Prothrombin Time


 


 


 15.0 SEC


(11.7-14.0) 





 


Prothromb Time International


Ratio 


 


 1.2 (0.8-1.1) 


 





 


Activated Partial


Thromboplast Time 


 


 47 SEC (24-38) 


 





 


Erythrocyte Sedimentation Rate    > 130 (0-15) 


 


C-Reactive Protein,


Quantitative 


 


 


 195.5 mg/L


(0-3.3)











Assessment


Assessment


POD#





Plan


Plan of Care


Pt seen, laying in bed comfortably.  Pt denies any problems overnight. Feels 

like the antibiotics are working.  He says that he can use his left hand more 

but he is concerned that this is moving to his R hand.  He has noticed pain and 

swelling on the MCP joint of the index finger on his R hand since last night and

feels like it is worse today.  No N/T or radicular symptoms.


L hand has intact skin.  Erythema and swelling have shown improvement since 

evaluation yesterday afternoon.  Patient will let me passively move his fingers 

move.  Still warm on the dorsum of the hand.  The flexors/palm of hand and volar

aspect of wrist/forearm all benign.  NVI in the fingers.


The R hand has intact skin.  There is swelling and TTP in the index finger.  He 

will allow me to passively move the index finger but is protective of this.  No 

erythema or warmth right now.  This appears to be isolated to the index finger 

of the right hand at this time.  NVI.


Overall the patient has improved so we will not go to the OR today.  MRI 

reviewed, no specific abscess seen.  Since he has shown improvement, we will 

wait on any further imaging.  Pt denies any FH of rheumatoid arthritis but we 

will check rheumatoid factor.  Can have full work up outpatient if needed.  Will

wait for ID consult as well.  OK to eat today.  Ortho will be available if 

needed but there is no indication now for surgery.  Please call us immediately 

if patient declines.  OK to get in the shower. Discussed with Dr. Estrada.





Justicifation of Admission Dx:


Justifications for Admission:


Justification of Admission Dx:  Yes











JULIET SINGH          Apr 8, 2022 09:40

## 2022-04-08 NOTE — CONS
DATE OF CONSULTATION: 04/08/2022

REQUESTING PHYSICIAN:  Dr. Rodriguez.



REASON FOR CONSULTATION:  Left wrist infection.



HISTORY OF PRESENT ILLNESS:  This is a 56-year-old  gentleman who came 

in with left wrist swelling and pain.  The patient denies any injury, denies any

lifting, twisting.  The patient has had gout in the past in the extremity and 

somewhat feels the same, he says.  The patient denies any fever or chills, 

nausea, vomiting, diarrhea.  The patient had a sed rate of more than 130, normal

white count, creatinine 1.4.  The patient underwent MRI of the hand, which 

showed significant inflammation, but no abscess and the patient has been put on 

vancomycin and colchicine.



PAST MEDICAL HISTORY:  Positive for gout, history of hypertension, peptic ulcer 

disease, renal insufficiency, diverticulitis.



SOCIAL HISTORY:  Negative for smoking, alcohol, illicit drug use.



ALLERGIES:  No known drug allergies.



CURRENT MEDICATIONS:  Reviewed.  The patient is on IV vancomycin.



ROS : as per HPI, rest of the systems reviewed and are neg.



PHYSICAL EXAMINATION:

GENERAL:  Alert, oriented gentleman, not in distress.

VITAL SIGNS:  Stable.  T-max is 101.3, pulse 90, respirations 18, blood pressure

124/80.

HEENT:  Both pupils are round and reacting.  No conjunctival lesion, no lesion 

in the mouth.

NECK:  Supple, no JVP, no lymphadenopathy.

LUNGS:  Clear.

HEART:  S1, S2, regular.

ABDOMEN:  Soft, nontender, no organomegaly.

EXTREMITIES:  All the extremities are unremarkable except left hand and wrist is

significantly swollen, red and tender and any movement is extremely painful.

NEUROLOGIC:  The patient is alert, awake, appropriate.  No focal neurologic 

deficit.



LABORATORY DATA:  White count is normal.  Sed rate is more than 130.  BUN and 

creatinine is 13 and 1.4.  CRP is 195.  Lactic acid was up to 2.6. Actually, 

creatinine was 1.7 when he came in.  Blood culture is negative.  MRI as I 

mentioned.  X-ray of the hand was unremarkable for any bony changes.



IMPRESSION:

1.  Left hand swelling, tenderness and redness, points towards most likely 

infection, doubt can do the similar picture.

2.  Acute kidney injury.

3.  Hypertension.

4.  History of gastrointestinal bleed.



RECOMMENDATIONS:  Discontinue vancomycin and change to daptomycin and Zosyn, 

supportive care, anti-inflammatory and will continue to follow.



Thank you very much, Dr. Rodriguez, for giving me opportunity to participate in 

this patient's care.







YIFAN/ADAM

DR: David   DD: 04/08/2022 15:43

DT: 04/08/2022 21:51   TID: [id number]

ROMOE

## 2022-04-09 VITALS — SYSTOLIC BLOOD PRESSURE: 109 MMHG | DIASTOLIC BLOOD PRESSURE: 78 MMHG

## 2022-04-09 VITALS — SYSTOLIC BLOOD PRESSURE: 106 MMHG | DIASTOLIC BLOOD PRESSURE: 68 MMHG

## 2022-04-09 VITALS — DIASTOLIC BLOOD PRESSURE: 81 MMHG | SYSTOLIC BLOOD PRESSURE: 119 MMHG

## 2022-04-09 VITALS — DIASTOLIC BLOOD PRESSURE: 78 MMHG | SYSTOLIC BLOOD PRESSURE: 117 MMHG

## 2022-04-09 VITALS — DIASTOLIC BLOOD PRESSURE: 76 MMHG | SYSTOLIC BLOOD PRESSURE: 110 MMHG

## 2022-04-09 VITALS — SYSTOLIC BLOOD PRESSURE: 116 MMHG | DIASTOLIC BLOOD PRESSURE: 80 MMHG

## 2022-04-09 LAB
ANION GAP SERPL CALC-SCNC: 12 MMOL/L (ref 6–14)
BASOPHILS # BLD AUTO: 0.1 X10^3/UL (ref 0–0.2)
BASOPHILS NFR BLD: 1 % (ref 0–3)
BUN SERPL-MCNC: 11 MG/DL (ref 8–26)
CALCIUM SERPL-MCNC: 8.9 MG/DL (ref 8.5–10.1)
CHLORIDE SERPL-SCNC: 108 MMOL/L (ref 98–107)
CO2 SERPL-SCNC: 21 MMOL/L (ref 21–32)
CREAT SERPL-MCNC: 1.4 MG/DL (ref 0.7–1.3)
EOSINOPHIL NFR BLD: 0.2 X10^3/UL (ref 0–0.7)
EOSINOPHIL NFR BLD: 3 % (ref 0–3)
ERYTHROCYTE [DISTWIDTH] IN BLOOD BY AUTOMATED COUNT: 18.3 % (ref 11.5–14.5)
GFR SERPLBLD BASED ON 1.73 SQ M-ARVRAT: 52.1 ML/MIN
GLUCOSE SERPL-MCNC: 87 MG/DL (ref 70–99)
HBA1C MFR BLD: 5.1 % (ref 4.8–5.6)
HCT VFR BLD CALC: 21.6 % (ref 39–53)
HGB BLD-MCNC: 7.3 G/DL (ref 13–17.5)
LYMPHOCYTES # BLD: 1 X10^3/UL (ref 1–4.8)
LYMPHOCYTES NFR BLD AUTO: 14 % (ref 24–48)
MCH RBC QN AUTO: 30 PG (ref 25–35)
MCHC RBC AUTO-ENTMCNC: 34 G/DL (ref 31–37)
MCV RBC AUTO: 88 FL (ref 79–100)
MONO #: 0.5 X10^3/UL (ref 0–1.1)
MONOCYTES NFR BLD: 8 % (ref 0–9)
NEUT #: 5.3 X10^3/UL (ref 1.8–7.7)
NEUTROPHILS NFR BLD AUTO: 74 % (ref 31–73)
PLATELET # BLD AUTO: 255 X10^3/UL (ref 140–400)
POTASSIUM SERPL-SCNC: 3.3 MMOL/L (ref 3.5–5.1)
RBC # BLD AUTO: 2.47 X10^6/UL (ref 4.3–5.7)
SODIUM SERPL-SCNC: 141 MMOL/L (ref 136–145)
WBC # BLD AUTO: 7.1 X10^3/UL (ref 4–11)

## 2022-04-09 RX ADMIN — PIPERACILLIN SODIUM AND TAZOBACTAM SODIUM SCH MLS/HR: 3; .375 INJECTION, POWDER, LYOPHILIZED, FOR SOLUTION INTRAVENOUS at 17:17

## 2022-04-09 RX ADMIN — FEBUXOSTAT SCH MG: 40 TABLET ORAL at 08:06

## 2022-04-09 RX ADMIN — PIPERACILLIN SODIUM AND TAZOBACTAM SODIUM SCH MLS/HR: 3; .375 INJECTION, POWDER, LYOPHILIZED, FOR SOLUTION INTRAVENOUS at 05:59

## 2022-04-09 RX ADMIN — Medication SCH CAP: at 23:02

## 2022-04-09 RX ADMIN — DAPTOMYCIN SCH MLS/HR: 500 INJECTION, POWDER, LYOPHILIZED, FOR SOLUTION INTRAVENOUS at 16:23

## 2022-04-09 RX ADMIN — ENOXAPARIN SODIUM SCH MG: 40 INJECTION SUBCUTANEOUS at 16:23

## 2022-04-09 RX ADMIN — PIPERACILLIN SODIUM AND TAZOBACTAM SODIUM SCH MLS/HR: 3; .375 INJECTION, POWDER, LYOPHILIZED, FOR SOLUTION INTRAVENOUS at 11:20

## 2022-04-09 RX ADMIN — Medication SCH TAB: at 08:06

## 2022-04-09 RX ADMIN — FAMOTIDINE SCH MG: 20 TABLET ORAL at 23:01

## 2022-04-09 RX ADMIN — PIPERACILLIN SODIUM AND TAZOBACTAM SODIUM SCH MLS/HR: 3; .375 INJECTION, POWDER, LYOPHILIZED, FOR SOLUTION INTRAVENOUS at 23:58

## 2022-04-09 RX ADMIN — COLCHICINE SCH MG: 0.6 TABLET, FILM COATED ORAL at 08:06

## 2022-04-09 RX ADMIN — MINERAL SUPPLEMENT IRON 300 MG / 5 ML STRENGTH LIQUID 100 PER BOX UNFLAVORED SCH MG: at 16:23

## 2022-04-09 RX ADMIN — METOPROLOL SUCCINATE SCH MG: 50 TABLET, EXTENDED RELEASE ORAL at 08:05

## 2022-04-09 RX ADMIN — PIPERACILLIN SODIUM AND TAZOBACTAM SODIUM SCH MLS/HR: 3; .375 INJECTION, POWDER, LYOPHILIZED, FOR SOLUTION INTRAVENOUS at 00:04

## 2022-04-09 NOTE — PDOC
Infectious Disease Note


Subjective


Subjective


pt is feeling better, less swelling and redness, pain is about same





ROS


ROS


no n/v/d/





Vital Sign


Vital Signs





Vital Signs








  Date Time  Temp Pulse Resp B/P (MAP) Pulse Ox O2 Delivery O2 Flow Rate FiO2


 


4/9/22 15:30 98.6 82 16 116/80 (92) 97 Room Air  





 98.6       











Physical Exam


PHYSICAL EXAM


GENERAL:  Alert, oriented gentleman, not in distress.


VITAL SIGNS:  Stable.  


HEENT:  Both pupils are round and reacting.  No conjunctival lesion, no lesion 


in the mouth.


NECK:  Supple, no JVP, no lymphadenopathy.


LUNGS:  Clear.


HEART:  S1, S2, regular.


ABDOMEN:  Soft, nontender, no organomegaly.


EXTREMITIES:  All the extremities are unremarkable except left hand and wrist is


significantly swollen, red and tender and any movement is extremely painful.


NEUROLOGIC:  The patient is alert, awake, appropriate.  No focal neurologic 


deficit.





Labs


Lab





Laboratory Tests








Test


 4/9/22


05:00 4/9/22


08:20


 


White Blood Count


 7.1 x10^3/uL


(4.0-11.0) 





 


Red Blood Count


 2.47 x10^6/uL


(4.30-5.70) 





 


Hemoglobin


 7.3 g/dL


(13.0-17.5) 





 


Hematocrit


 21.6 %


(39.0-53.0) 





 


Mean Corpuscular Volume 88 fL ()  


 


Mean Corpuscular Hemoglobin 30 pg (25-35)  


 


Mean Corpuscular Hemoglobin


Concent 34 g/dL


(31-37) 





 


Red Cell Distribution Width


 18.3 %


(11.5-14.5) 





 


Platelet Count


 255 x10^3/uL


(140-400) 





 


Neutrophils (%) (Auto) 74 % (31-73)  


 


Lymphocytes (%) (Auto) 14 % (24-48)  


 


Monocytes (%) (Auto) 8 % (0-9)  


 


Eosinophils (%) (Auto) 3 % (0-3)  


 


Basophils (%) (Auto) 1 % (0-3)  


 


Neutrophils # (Auto)


 5.3 x10^3/uL


(1.8-7.7) 





 


Lymphocytes # (Auto)


 1.0 x10^3/uL


(1.0-4.8) 





 


Monocytes # (Auto)


 0.5 x10^3/uL


(0.0-1.1) 





 


Eosinophils # (Auto)


 0.2 x10^3/uL


(0.0-0.7) 





 


Basophils # (Auto)


 0.1 x10^3/uL


(0.0-0.2) 





 


Erythrocyte Sedimentation Rate  > 130 (0-15) 


 


Sodium Level


 


 141 mmol/L


(136-145)


 


Potassium Level


 


 3.3 mmol/L


(3.5-5.1)


 


Chloride Level


 


 108 mmol/L


()


 


Carbon Dioxide Level


 


 21 mmol/L


(21-32)


 


Anion Gap  12 (6-14) 


 


Blood Urea Nitrogen


 


 11 mg/dL


(8-26)


 


Creatinine


 


 1.4 mg/dL


(0.7-1.3)


 


Estimated GFR


(Cockcroft-Gault) 


 52.1 





 


Glucose Level


 


 87 mg/dL


(70-99)


 


Calcium Level


 


 8.9 mg/dL


(8.5-10.1)


 


Creatine Kinase


 


 18 U/L


()


 


C-Reactive Protein,


Quantitative 


 169.9 mg/L


(0-3.3)








Micro





Microbiology


4/7/22 Blood Culture - Preliminary, Resulted


         NO GROWTH AFTER 2 DAYS





Objective


Assessment








IMPRESSION:


1.  Left hand swelling, tenderness and redness, points towards most likely 


infection, doubt can do the similar picture.


2.  Acute kidney injury.


3.  Hypertension.


4.  History of gastrointestinal bleed.





Plan


Plan of Care


cont antibiotics


cont JAMES Pete MD                Apr 9, 2022 17:05

## 2022-04-10 VITALS — DIASTOLIC BLOOD PRESSURE: 76 MMHG | SYSTOLIC BLOOD PRESSURE: 113 MMHG

## 2022-04-10 VITALS — DIASTOLIC BLOOD PRESSURE: 82 MMHG | SYSTOLIC BLOOD PRESSURE: 111 MMHG

## 2022-04-10 VITALS — DIASTOLIC BLOOD PRESSURE: 76 MMHG | SYSTOLIC BLOOD PRESSURE: 107 MMHG

## 2022-04-10 VITALS — SYSTOLIC BLOOD PRESSURE: 110 MMHG | DIASTOLIC BLOOD PRESSURE: 76 MMHG

## 2022-04-10 VITALS — SYSTOLIC BLOOD PRESSURE: 109 MMHG | DIASTOLIC BLOOD PRESSURE: 77 MMHG

## 2022-04-10 VITALS — DIASTOLIC BLOOD PRESSURE: 81 MMHG | SYSTOLIC BLOOD PRESSURE: 119 MMHG

## 2022-04-10 RX ADMIN — PIPERACILLIN SODIUM AND TAZOBACTAM SODIUM SCH MLS/HR: 3; .375 INJECTION, POWDER, LYOPHILIZED, FOR SOLUTION INTRAVENOUS at 11:35

## 2022-04-10 RX ADMIN — PIPERACILLIN SODIUM AND TAZOBACTAM SODIUM SCH MLS/HR: 3; .375 INJECTION, POWDER, LYOPHILIZED, FOR SOLUTION INTRAVENOUS at 17:05

## 2022-04-10 RX ADMIN — ENOXAPARIN SODIUM SCH MG: 40 INJECTION SUBCUTANEOUS at 16:30

## 2022-04-10 RX ADMIN — PIPERACILLIN SODIUM AND TAZOBACTAM SODIUM SCH MLS/HR: 3; .375 INJECTION, POWDER, LYOPHILIZED, FOR SOLUTION INTRAVENOUS at 06:55

## 2022-04-10 RX ADMIN — Medication SCH CAP: at 20:56

## 2022-04-10 RX ADMIN — MINERAL SUPPLEMENT IRON 300 MG / 5 ML STRENGTH LIQUID 100 PER BOX UNFLAVORED SCH MG: at 17:02

## 2022-04-10 RX ADMIN — Medication SCH TAB: at 08:28

## 2022-04-10 RX ADMIN — FEBUXOSTAT SCH MG: 40 TABLET ORAL at 08:28

## 2022-04-10 RX ADMIN — DAPTOMYCIN SCH MLS/HR: 500 INJECTION, POWDER, LYOPHILIZED, FOR SOLUTION INTRAVENOUS at 16:26

## 2022-04-10 RX ADMIN — COLCHICINE SCH MG: 0.6 TABLET, FILM COATED ORAL at 08:28

## 2022-04-10 RX ADMIN — Medication SCH CAP: at 08:29

## 2022-04-10 RX ADMIN — FAMOTIDINE SCH MG: 20 TABLET ORAL at 20:56

## 2022-04-10 RX ADMIN — METOPROLOL SUCCINATE SCH MG: 50 TABLET, EXTENDED RELEASE ORAL at 08:29

## 2022-04-10 NOTE — PDOC
Infectious Disease Note


Subjective:


Subjective


pt is feeling better, less swelling and redness, pain has been stable





Vital Signs:


Vital Signs





Vital Signs








  Date Time  Temp Pulse Resp B/P (MAP) Pulse Ox O2 Delivery O2 Flow Rate FiO2


 


4/10/22 15:00 98.8 67 18 107/76 (86) 97 Room Air  





 98.8       











Physical Exam:


PHYSICAL EXAM


GENERAL:  Alert, oriented gentleman, not in distress.


VITAL SIGNS:  Stable.  


HEENT:  Both pupils are round and reacting.  No conjunctival lesion, no lesion 


in the mouth.


NECK:  Supple, no JVP, no lymphadenopathy.


LUNGS:  Clear.


HEART:  S1, S2, regular.


ABDOMEN:  Soft, nontender, no organomegaly.


EXTREMITIES:  All the extremities are unremarkable except left hand and wrist is


significantly swollen, red and tender and any movement is extremely painful.


NEUROLOGIC:  The patient is alert, awake, appropriate.  No focal neurologic 


deficit.





Medications:


Inpatient Meds:


Medications reviewed.





Labs:


Lab





Laboratory Tests








Test


 4/10/22


07:05


 


Erythrocyte Sedimentation Rate > 130 (0-15) 


 


C-Reactive Protein,


Quantitative 112.4 mg/L


(0-3.3)











Objective:


Assessment:


1.  Left hand swelling, tenderness and redness, points towards most likely 


infection, gout can do the similar picture.


2.  Acute kidney injury.


3.  Hypertension.


4.  History of gastrointestinal bleed.





Plan:


Plan of Care


cont antibiotics


cont DAVID Pete MD           Apr 10, 2022 16:11

## 2022-04-10 NOTE — PDOC
[facility] PROGRESS NOTE


Date of Service


DOS:


DATE: 4/10/22 


TIME: 23:11





Chief Complaint


Chief Complaint


left hand and wrist celluliit,s  IV vanco


Hx GOUT<  uric acid high, consult ortho, prob may have startd with gout,      

Give cochicine and uloric, 


Htn, metoprolol,   would wean off, not best choice for monotherapy,.





leg weakness, PT and OT 


CKD 3


mod/severe malnutrition,   prior gut surgeries,   perforations,       probable 

short gut, but he denies





History of Present Illness


History of Present Illness


4/10


Evaluate examined at bedside.  Continue antibiotics.  Requested a one-time dose 

of oxycodone.  Will order.  Likely discharge in the next day or 2.





4/9


Patient evaluated examined at bedside.  Pain improving.  Continue IV antibiotics





4/8


Seen at bedside, pain improving but still definitely present.  As needed pain 

control. ID consult.  Continue gout treatment.





Vitals/I&O


Vitals/I&O:





                                   Vital Signs








  Date Time  Temp Pulse Resp B/P (MAP) Pulse Ox O2 Delivery O2 Flow Rate FiO2


 


4/10/22 19:00 98.4 73 14 113/76 (88) 97 Room Air  





 98.4       














                                    I & O   


 


 4/9/22 4/9/22 4/10/22





 15:00 23:00 07:00


 


Intake Total 350 ml 450 ml 


 


Output Total 200 ml 200 ml 500 ml


 


Balance 150 ml 250 ml -500 ml











Physical Exam


Physical Exam:


GENERAL:  Alert, oriented gentleman, not in distress.


VITAL SIGNS:  Stable.  


HEENT:  Both pupils are round and reacting.  No conjunctival lesion, no lesion 


in the mouth.


NECK:  Supple, no JVP, no lymphadenopathy.


LUNGS:  Clear.


HEART:  S1, S2, regular.


ABDOMEN:  Soft, nontender, no organomegaly.


EXTREMITIES:  All the extremities are unremarkable except left hand and wrist is


significantly swollen, red and tender and any movement is extremely painful.


NEUROLOGIC:  The patient is alert, awake, appropriate.  No focal neurologic 


deficit.


General:  Alert, Oriented X3


Heart:  Regular rate, Normal S1, Normal S2


Lungs:  Clear


Abdomen:  Soft, No tenderness, Other (scars from 3 prior surg,  2 prior ostomy 

placements)


Extremities:  Normal pulses, Other (left hand red, swollen,   poor ROM wrist, )


Skin:  No breakdown, Other (redness marked ot left wrist,  splotchy,   blanches)





Labs


Labs:





Laboratory Tests








Test


 4/10/22


07:05


 


Erythrocyte Sedimentation Rate > 130 (0-15) 


 


C-Reactive Protein,


Quantitative 112.4 mg/L


(0-3.3)











Assessment and Plan


Assessmemt and Plan


Problems


Medical Problems:


(1) Cellulitis


Status: Acute  











Comment


Review of Relevant


I have reviewed the following items freddy (where applicable) has been applied.


Medications:





Current Medications








 Medications


  (Trade)  Dose


 Ordered  Sig/Kim


 Route


 PRN Reason  Start Time


 Stop Time Status Last Admin


Dose Admin


 


 Oxycodone/


 Acetaminophen


  (Percocet 5/325)  1 tab  PRN Q4HRS  PRN


 PO


 PAIN  4/10/22 11:30


    4/10/22 11:36














Justifications for Admission


Other Justification














ANDREE WEISS MD         Apr 10, 2022 23:12

## 2022-04-11 VITALS — SYSTOLIC BLOOD PRESSURE: 122 MMHG | DIASTOLIC BLOOD PRESSURE: 79 MMHG

## 2022-04-11 VITALS — SYSTOLIC BLOOD PRESSURE: 94 MMHG | DIASTOLIC BLOOD PRESSURE: 55 MMHG

## 2022-04-11 VITALS — SYSTOLIC BLOOD PRESSURE: 131 MMHG | DIASTOLIC BLOOD PRESSURE: 72 MMHG

## 2022-04-11 RX ADMIN — PIPERACILLIN SODIUM AND TAZOBACTAM SODIUM SCH MLS/HR: 3; .375 INJECTION, POWDER, LYOPHILIZED, FOR SOLUTION INTRAVENOUS at 00:01

## 2022-04-11 RX ADMIN — MINERAL SUPPLEMENT IRON 300 MG / 5 ML STRENGTH LIQUID 100 PER BOX UNFLAVORED SCH MG: at 09:31

## 2022-04-11 RX ADMIN — Medication SCH TAB: at 09:32

## 2022-04-11 RX ADMIN — FEBUXOSTAT SCH MG: 40 TABLET ORAL at 09:31

## 2022-04-11 RX ADMIN — PIPERACILLIN SODIUM AND TAZOBACTAM SODIUM SCH MLS/HR: 3; .375 INJECTION, POWDER, LYOPHILIZED, FOR SOLUTION INTRAVENOUS at 06:16

## 2022-04-11 RX ADMIN — COLCHICINE SCH MG: 0.6 TABLET, FILM COATED ORAL at 09:32

## 2022-04-11 RX ADMIN — Medication SCH CAP: at 09:31

## 2022-04-11 RX ADMIN — METOPROLOL SUCCINATE SCH MG: 50 TABLET, EXTENDED RELEASE ORAL at 09:32

## 2022-04-11 NOTE — PDOC3
Discharge Summary


Visit Information


Date of Admission:  Apr 7, 2022


Date of Discharge:  Apr 11, 2022


Admitting Diagnosis:  Bilateral hand cellulitis, gout flare


Final Diagnosis


Problems


Medical Problems:


(1) Cellulitis


Status: Acute  











Brief Hospital Course


Allergies





                                    Allergies








Coded Allergies Type Severity Reaction Last Updated Verified


 


  No Known Drug Allergies    6/30/20 No








Vital Signs





Vital Signs








  Date Time  Temp Pulse Resp B/P (MAP) Pulse Ox O2 Delivery O2 Flow Rate FiO2


 


4/11/22 09:32  61  122/79    


 


4/11/22 08:00      Room Air  


 


4/11/22 07:00 98.5  18  99   





 98.5       








Lab Results





Laboratory Tests








Test


 4/10/22


07:05 4/11/22


05:35


 


Erythrocyte Sedimentation Rate > 130 (0-15)  > 130 (0-15) 


 


C-Reactive Protein,


Quantitative 112.4 mg/L


(0-3.3) 90.6 mg/L


(0-3.3)


 


Iron Level


 


 18 ug/dL


()


 


Total Iron Binding Capacity


 


 130 ug/dL


(250-450)


 


Iron Saturation  14 % (15-34) 








Laboratory Tests








Test


 4/11/22


05:35


 


Erythrocyte Sedimentation Rate > 130 (0-15) 


 


Iron Level


 18 ug/dL


()


 


Total Iron Binding Capacity


 130 ug/dL


(250-450)


 


Iron Saturation 14 % (15-34) 


 


C-Reactive Protein,


Quantitative 90.6 mg/L


(0-3.3)








Brief Hospital Course


Mr. Bernal is a 56-year-old male with past medical history gout hypertension 

peptic ulcer disease , CKD who was previously on allopurinol who comes to the ED

with pain and swelling in bilateral hands with redness starting on 4/4/2022.  

Was complaining of hyperesthesia.  Had MRI of his left hand showed significant 

inflammation no discrete abscess.  Was started on vancomycin and Zosyn as well 

as colchicine.  Given his creatinine 1.4, discontinued colchicine.  Sed rate.  

930  lactic acid 2.6, uric acid 11 rheumatoid factor was a little bit 

elevated CCP is pending.  Also with hemoglobin of 7.2 decreased TIBC and iron 

level of 16.  After all several days of IV antibiotics he felt improved.  Was 

transitioned steroids and p.o. antibiotics.


Consults: Orthopedic surgery and infectious diseases





4/11: Still swollen painful bilateral hands wrists elbows and ankles.  He does 

historic diagnosis of gout and was on allopurinol but stopped it because he had 

not had a flareup.  He has never heard of rheumatoid arthritis before elevated 

rheumatoid factor greater than 15.  CCP pending.  Will initiate steroids today 

if able to ambulate may be able to go home on p.o. antibiotics will discuss with

ID and Ortho.








Problem list:


left hand and wrist cellulitis  IV vanco--> dapto --> doxy + keflex


GOUT -  uric acid high, consult ortho, prob may have startd with gout,      Give

cochicine and uloric, will transition to steroids PO and restart allopurinol in 

2 weeks


Htn, metoprolol - should consider cozaar with hx gout


leg weakness, PT and OT - improved


CKD 3 - stable. Stop colchicine


mod/severe malnutrition - dietician to see





Greater than 30 minutes spent on d/c home with self care.





Discharge Information


Condition at Discharge:  Improved


Follow Up:  Weeks (1)


Disposition/Orders:  D/C to Home


Scheduled


Aspirin (Aspir-Low) 81 Mg Tablet.dr, 1 TAB PO DAILY, #30 Ref 3 (Reported)


   Entered as Reported by: DANICA SRIVASTAVA RN on 9/9/18 1245


Cephalexin (Cephalexin) 500 Mg Tablet, 1 TAB PO BID for Cellulitis for 7 Days, 

#14


   Prescribed by: ANDREE DIALLO MD on 4/11/22 1057


Doxycycline Monohydrate (Doxycycline Monohydrate) 100 Mg Capsule, 1 CAP PO BID 

for Cellulitis for 7 Days, #14


   Prescribed by: ANDREE DIALLO MD on 4/11/22 1057


Metoprolol Succinate (Metoprolol Succinate) 50 Mg Tab.er.24h, 100 MG PO DAILY, 

(Reported)


   Entered as Reported by: DANICA SRIVASTAVA RN on 9/9/18 1114


   Last Action: Continued on 4/7/22 1448 by MARISA MARSH


Prednisone (Prednisone) 20 Mg Tablet, 20 MG PO DAILY for Gout/Rheumatoid 

arthritis for 10 Days, #15


   Prescribed by: ANDREE DIALLO MD on 4/11/22 1057





Discontinued Medications


Indomethacin (Indomethacin) 50 Mg Capsule, 1 CAP PO TID PRN for PAIN, #30 Ref 1 

(Reported)


   Entered as Reported by: DANICA SRIVASTAVA RN on 9/9/18 1245





Justicifation of Admission Dx:


Justifications for Admission:


Justification of Admission Dx:  Yes











ANDREE DIALLO MD        Apr 11, 2022 11:04

## 2022-04-11 NOTE — PDOC
[facility] [facility] PROGRESS NOTE


Date of Service


DOS:


DATE: 4/11/22 


TIME: 10:31





Chief Complaint


Chief Complaint


left hand and wrist celluliit,s  IV vanco


Hx GOUT<  uric acid high, consult ortho, prob may have startd with gout,      

Give cochicine and uloric, 


Htn, metoprolol,   would wean off, not best choice for monotherapy,.








leg weakness, PT and OT 


CKD 3


mod/severe malnutrition,   prior gut surgeries,   perforations,       probable 

short gut, but he denies





History of Present Illness


History of Present Illness


4/11: Still swollen painful bilateral hands wrists elbows and ankles.  He does 

historic diagnosis of gout and was on allopurinol but stopped it because he had 

not had a flareup.  He has never heard of rheumatoid arthritis before elevated 

rheumatoid factor greater than 15.  CCP pending.  Will initiate steroids today 

if able to ambulate may be able to go home on p.o. antibiotics will discuss with

ID and Ortho.





4/10: Evaluate examined at bedside.  Continue antibiotics.  Requested a one-time

dose of oxycodone.  Will order.  Likely discharge in the next day or 2.


4/9: Patient evaluated examined at bedside.  Pain improving.  Continue IV 

antibiotics


4/8: Seen at bedside, pain improving but still definitely present.  As needed 

pain control. ID consult.  Continue gout treatment.





Vitals/I&O


Vitals/I&O:





                                   Vital Signs








  Date Time  Temp Pulse Resp B/P (MAP) Pulse Ox O2 Delivery O2 Flow Rate FiO2


 


4/11/22 09:32  61  122/79    


 


4/11/22 07:00 98.5  18  99 Room Air  





 98.5       














                                    I & O   


 


 4/10/22 4/10/22 4/11/22





 15:00 23:00 07:00


 


Intake Total 120 ml 120 ml 100 ml


 


Output Total   200 ml


 


Balance 120 ml 120 ml -100 ml











Physical Exam


Physical Exam:


GENERAL:  Alert, oriented gentleman, not in distress.


VITAL SIGNS:  Stable.  


HEENT:  Both pupils are round and reacting.  No conjunctival lesion, no lesion 


in the mouth.


NECK:  Supple, no JVP, no lymphadenopathy.


LUNGS:  Clear.


HEART:  S1, S2, regular.


ABDOMEN:  Soft, nontender, no organomegaly.


EXTREMITIES:  All the extremities are unremarkable except left hand and wrist is


significantly swollen, red and tender and any movement is extremely painful.


NEUROLOGIC:  The patient is alert, awake, appropriate.  No focal neurologic 


deficit.


General:  Alert, Oriented X3


Heart:  Regular rate, Normal S1, Normal S2


Lungs:  Clear


Abdomen:  Soft, No tenderness, Other (scars from 3 prior surg,  2 prior ostomy 

placements)


Extremities:  Normal pulses, Other (left hand red, swollen,   poor ROM wrist, )


Skin:  No breakdown, Other (redness marked ot left wrist,  splotchy,   blanches)





Labs


Labs:





Laboratory Tests








Test


 4/11/22


05:35


 


Erythrocyte Sedimentation Rate > 130 (0-15) 


 


C-Reactive Protein,


Quantitative 90.6 mg/L


(0-3.3)











Assessment and Plan


Assessmemt and Plan


Problems


Medical Problems:


(1) Cellulitis


Status: Acute  











Comment


Review of Relevant


I have reviewed the following items freddy (where applicable) has been applied.


Medications:





Current Medications








 Medications


  (Trade)  Dose


 Ordered  Sig/Kim


 Route


 PRN Reason  Start Time


 Stop Time Status Last Admin


Dose Admin


 


 Oxycodone/


 Acetaminophen


  (Percocet 5/325)  1 tab  PRN Q4HRS  PRN


 PO


 PAIN  4/10/22 11:30


    4/10/22 11:36














Justifications for Admission


Other Justification














ANDREE DIALLO MD        Apr 11, 2022 10:43

## 2022-04-11 NOTE — NUR
awaiting ride. reviewed written discharge instructions regarding following up with Dr. West 
and explained about his new medications. When he gets the antibiotics he is to take both 
doses and only 1 dose of the prednisone. He had 1 dose here in the hospital. verbalized 
understanding saline lock removed.

## 2022-04-22 ENCOUNTER — HOSPITAL ENCOUNTER (INPATIENT)
Dept: HOSPITAL 61 - ER | Age: 57
LOS: 4 days | Discharge: HOME | DRG: 377 | End: 2022-04-26
Attending: INTERNAL MEDICINE | Admitting: INTERNAL MEDICINE
Payer: COMMERCIAL

## 2022-04-22 VITALS — WEIGHT: 138.89 LBS | HEIGHT: 60 IN | BODY MASS INDEX: 27.27 KG/M2

## 2022-04-22 DIAGNOSIS — K26.4: Primary | ICD-10-CM

## 2022-04-22 DIAGNOSIS — I95.89: ICD-10-CM

## 2022-04-22 DIAGNOSIS — F17.210: ICD-10-CM

## 2022-04-22 DIAGNOSIS — I10: ICD-10-CM

## 2022-04-22 DIAGNOSIS — K76.0: ICD-10-CM

## 2022-04-22 DIAGNOSIS — E43: ICD-10-CM

## 2022-04-22 DIAGNOSIS — K57.91: ICD-10-CM

## 2022-04-22 DIAGNOSIS — F10.10: ICD-10-CM

## 2022-04-22 DIAGNOSIS — D64.9: ICD-10-CM

## 2022-04-22 LAB
ALBUMIN SERPL-MCNC: 1.9 G/DL (ref 3.4–5)
ALBUMIN/GLOB SERPL: 0.5 {RATIO} (ref 1–1.7)
ALP SERPL-CCNC: 65 U/L (ref 46–116)
ALT SERPL-CCNC: 23 U/L (ref 16–63)
ANION GAP SERPL CALC-SCNC: 18 MMOL/L (ref 6–14)
ANISOCYTOSIS BLD QL SMEAR: (no result)
APTT BLD: 35 SEC (ref 24–38)
AST SERPL-CCNC: 15 U/L (ref 15–37)
BASOPHILS # BLD AUTO: 0.1 X10^3/UL (ref 0–0.2)
BASOPHILS NFR BLD: 0 % (ref 0–3)
BILIRUB SERPL-MCNC: 0.4 MG/DL (ref 0.2–1)
BUN SERPL-MCNC: 55 MG/DL (ref 8–26)
BUN/CREAT SERPL: 31 (ref 6–20)
CALCIUM SERPL-MCNC: 8.3 MG/DL (ref 8.5–10.1)
CHLORIDE SERPL-SCNC: 106 MMOL/L (ref 98–107)
CO2 SERPL-SCNC: 15 MMOL/L (ref 21–32)
CREAT SERPL-MCNC: 1.8 MG/DL (ref 0.7–1.3)
EOSINOPHIL NFR BLD: 0.1 X10^3/UL (ref 0–0.7)
EOSINOPHIL NFR BLD: 1 % (ref 0–3)
ERYTHROCYTE [DISTWIDTH] IN BLOOD BY AUTOMATED COUNT: 20.1 % (ref 11.5–14.5)
GFR SERPLBLD BASED ON 1.73 SQ M-ARVRAT: 38.9 ML/MIN
GLUCOSE SERPL-MCNC: 159 MG/DL (ref 70–99)
HCT VFR BLD CALC: 18.9 % (ref 39–53)
HGB BLD-MCNC: 6.3 G/DL (ref 13–17.5)
LYMPHOCYTES # BLD: 2.2 X10^3/UL (ref 1–4.8)
LYMPHOCYTES NFR BLD AUTO: 14 % (ref 24–48)
MCH RBC QN AUTO: 30 PG (ref 25–35)
MCHC RBC AUTO-ENTMCNC: 33 G/DL (ref 31–37)
MCV RBC AUTO: 89 FL (ref 79–100)
MONO #: 1 X10^3/UL (ref 0–1.1)
MONOCYTES NFR BLD: 7 % (ref 0–9)
NEUT #: 12.3 X10^3/UL (ref 1.8–7.7)
NEUTROPHILS NFR BLD AUTO: 78 % (ref 31–73)
PLATELET # BLD AUTO: 377 X10^3/UL (ref 140–400)
PLATELET # BLD EST: ADEQUATE 10*3/UL
POTASSIUM SERPL-SCNC: 4.4 MMOL/L (ref 3.5–5.1)
PROT SERPL-MCNC: 5.6 G/DL (ref 6.4–8.2)
PROTHROMBIN TIME: 16.4 SEC (ref 11.7–14)
RBC # BLD AUTO: 2.12 X10^6/UL (ref 4.3–5.7)
SODIUM SERPL-SCNC: 139 MMOL/L (ref 136–145)
WBC # BLD AUTO: 15.8 X10^3/UL (ref 4–11)

## 2022-04-22 PROCEDURE — 80053 COMPREHEN METABOLIC PANEL: CPT

## 2022-04-22 PROCEDURE — 85730 THROMBOPLASTIN TIME PARTIAL: CPT

## 2022-04-22 PROCEDURE — 36430 TRANSFUSION BLD/BLD COMPNT: CPT

## 2022-04-22 PROCEDURE — P9016 RBC LEUKOCYTES REDUCED: HCPCS

## 2022-04-22 PROCEDURE — 71045 X-RAY EXAM CHEST 1 VIEW: CPT

## 2022-04-22 PROCEDURE — 96375 TX/PRO/DX INJ NEW DRUG ADDON: CPT

## 2022-04-22 PROCEDURE — 85025 COMPLETE CBC W/AUTO DIFF WBC: CPT

## 2022-04-22 PROCEDURE — 86850 RBC ANTIBODY SCREEN: CPT

## 2022-04-22 PROCEDURE — 85610 PROTHROMBIN TIME: CPT

## 2022-04-22 PROCEDURE — 36415 COLL VENOUS BLD VENIPUNCTURE: CPT

## 2022-04-22 PROCEDURE — 86900 BLOOD TYPING SEROLOGIC ABO: CPT

## 2022-04-22 PROCEDURE — 86920 COMPATIBILITY TEST SPIN: CPT

## 2022-04-22 PROCEDURE — 93005 ELECTROCARDIOGRAM TRACING: CPT

## 2022-04-22 PROCEDURE — 86901 BLOOD TYPING SEROLOGIC RH(D): CPT

## 2022-04-22 PROCEDURE — 85018 HEMOGLOBIN: CPT

## 2022-04-22 PROCEDURE — 96374 THER/PROPH/DIAG INJ IV PUSH: CPT

## 2022-04-22 PROCEDURE — 85014 HEMATOCRIT: CPT

## 2022-04-22 PROCEDURE — 96361 HYDRATE IV INFUSION ADD-ON: CPT

## 2022-04-22 PROCEDURE — G0378 HOSPITAL OBSERVATION PER HR: HCPCS

## 2022-04-23 VITALS — SYSTOLIC BLOOD PRESSURE: 99 MMHG | DIASTOLIC BLOOD PRESSURE: 52 MMHG

## 2022-04-23 VITALS — DIASTOLIC BLOOD PRESSURE: 60 MMHG | SYSTOLIC BLOOD PRESSURE: 84 MMHG

## 2022-04-23 VITALS — DIASTOLIC BLOOD PRESSURE: 53 MMHG | SYSTOLIC BLOOD PRESSURE: 83 MMHG

## 2022-04-23 VITALS — SYSTOLIC BLOOD PRESSURE: 85 MMHG | DIASTOLIC BLOOD PRESSURE: 59 MMHG

## 2022-04-23 VITALS — SYSTOLIC BLOOD PRESSURE: 129 MMHG | DIASTOLIC BLOOD PRESSURE: 82 MMHG

## 2022-04-23 VITALS — DIASTOLIC BLOOD PRESSURE: 54 MMHG | SYSTOLIC BLOOD PRESSURE: 84 MMHG

## 2022-04-23 VITALS — SYSTOLIC BLOOD PRESSURE: 100 MMHG | DIASTOLIC BLOOD PRESSURE: 48 MMHG

## 2022-04-23 VITALS — SYSTOLIC BLOOD PRESSURE: 113 MMHG | DIASTOLIC BLOOD PRESSURE: 65 MMHG

## 2022-04-23 VITALS — SYSTOLIC BLOOD PRESSURE: 80 MMHG | DIASTOLIC BLOOD PRESSURE: 50 MMHG

## 2022-04-23 VITALS — DIASTOLIC BLOOD PRESSURE: 56 MMHG | SYSTOLIC BLOOD PRESSURE: 98 MMHG

## 2022-04-23 VITALS — SYSTOLIC BLOOD PRESSURE: 104 MMHG | DIASTOLIC BLOOD PRESSURE: 57 MMHG

## 2022-04-23 VITALS — SYSTOLIC BLOOD PRESSURE: 98 MMHG | DIASTOLIC BLOOD PRESSURE: 56 MMHG

## 2022-04-23 VITALS — DIASTOLIC BLOOD PRESSURE: 51 MMHG | SYSTOLIC BLOOD PRESSURE: 87 MMHG

## 2022-04-23 VITALS — SYSTOLIC BLOOD PRESSURE: 129 MMHG | DIASTOLIC BLOOD PRESSURE: 73 MMHG

## 2022-04-23 VITALS — SYSTOLIC BLOOD PRESSURE: 83 MMHG | DIASTOLIC BLOOD PRESSURE: 53 MMHG

## 2022-04-23 VITALS — DIASTOLIC BLOOD PRESSURE: 63 MMHG | SYSTOLIC BLOOD PRESSURE: 95 MMHG

## 2022-04-23 VITALS — DIASTOLIC BLOOD PRESSURE: 72 MMHG | SYSTOLIC BLOOD PRESSURE: 119 MMHG

## 2022-04-23 VITALS — SYSTOLIC BLOOD PRESSURE: 95 MMHG | DIASTOLIC BLOOD PRESSURE: 63 MMHG

## 2022-04-23 VITALS — SYSTOLIC BLOOD PRESSURE: 99 MMHG | DIASTOLIC BLOOD PRESSURE: 58 MMHG

## 2022-04-23 VITALS — DIASTOLIC BLOOD PRESSURE: 63 MMHG | SYSTOLIC BLOOD PRESSURE: 110 MMHG

## 2022-04-23 VITALS — SYSTOLIC BLOOD PRESSURE: 93 MMHG | DIASTOLIC BLOOD PRESSURE: 54 MMHG

## 2022-04-23 VITALS — SYSTOLIC BLOOD PRESSURE: 93 MMHG | DIASTOLIC BLOOD PRESSURE: 51 MMHG

## 2022-04-23 VITALS — DIASTOLIC BLOOD PRESSURE: 55 MMHG | SYSTOLIC BLOOD PRESSURE: 97 MMHG

## 2022-04-23 VITALS — DIASTOLIC BLOOD PRESSURE: 49 MMHG | SYSTOLIC BLOOD PRESSURE: 87 MMHG

## 2022-04-23 VITALS — DIASTOLIC BLOOD PRESSURE: 62 MMHG | SYSTOLIC BLOOD PRESSURE: 92 MMHG

## 2022-04-23 VITALS — DIASTOLIC BLOOD PRESSURE: 57 MMHG | SYSTOLIC BLOOD PRESSURE: 109 MMHG

## 2022-04-23 VITALS — DIASTOLIC BLOOD PRESSURE: 55 MMHG | SYSTOLIC BLOOD PRESSURE: 115 MMHG

## 2022-04-23 VITALS — SYSTOLIC BLOOD PRESSURE: 108 MMHG | DIASTOLIC BLOOD PRESSURE: 56 MMHG

## 2022-04-23 VITALS — SYSTOLIC BLOOD PRESSURE: 99 MMHG | DIASTOLIC BLOOD PRESSURE: 55 MMHG

## 2022-04-23 LAB
HCT VFR BLD CALC: 22.4 % (ref 39–53)
HGB BLD-MCNC: 7.6 G/DL (ref 13–17.5)
MCHC RBC AUTO-ENTMCNC: 34 G/DL (ref 31–37)

## 2022-04-23 PROCEDURE — 30233N1 TRANSFUSION OF NONAUTOLOGOUS RED BLOOD CELLS INTO PERIPHERAL VEIN, PERCUTANEOUS APPROACH: ICD-10-PCS | Performed by: INTERNAL MEDICINE

## 2022-04-23 RX ADMIN — PANTOPRAZOLE SODIUM SCH MG: 40 TABLET, DELAYED RELEASE ORAL at 18:00

## 2022-04-23 RX ADMIN — BACITRACIN SCH MLS/HR: 5000 INJECTION, POWDER, FOR SOLUTION INTRAMUSCULAR at 11:00

## 2022-04-23 NOTE — EKG
[facility]

              [address] [place] [address]

Test Date:    2022               Test Time:    22:18:18

Pat Name:     RUBY HAUSER              Department:   

Patient ID:   [id number]           Room:         111 1

Gender:       M                        Technician:   

:          1965               Requested By: BRENNA YEAGER

Order Number: [id number]           Reading MD:   Toni Cox

                                 Measurements

Intervals                              Axis          

Rate:         123                      P:            41

UT:           134                      QRS:          46

QRSD:         82                       T:            57

QT:           316                                    

QTc:          458                                    

                           Interpretive Statements

SINUS TACHYCARDIA

Electronically Signed On 2022 17:44:04 CDT by Toni Cox

## 2022-04-23 NOTE — RAD
AP chest x-ray



HISTORY: Syncope.



COMPARISON: Chest x-ray April 7, 2022



FINDINGS: There is an old deformity of the right humeral head and neck which is stable to prior x-ray
s. Heart size normal. Mediastinal silhouette is normal. Small calcified granuloma left lung. No pneum
othorax, pulmonary opacities or pleural effusions. Old healed left clavicle deformity. Old healed rib
 fractures.



IMPRESSION: No acute cardiopulmonary process.



Electronically signed by: Arnulfo Nguyễn MD (4/23/2022 12:01 AM) [facility]

## 2022-04-23 NOTE — PDOC
GENERAL


General:


History and physical [id number]





VITAL SIGNS


Vital Signs/I&O:





                                   Vital Signs








  Date Time  Temp Pulse Resp B/P (MAP) Pulse Ox O2 Delivery O2 Flow Rate FiO2


 


4/23/22 07:00 98.9 88 19 98/56    





 98.9       


 


4/23/22 06:00     98 Room Air  














                                    I & O   


 


 4/22/22 4/22/22 4/23/22





 15:00 23:00 07:00


 


Intake Total   827 ml


 


Output Total   400 ml


 


Balance   427 ml











ALLERGIES


Allergies:





Allergies








Coded Allergies Type Severity Reaction Last Updated Verified


 


  No Known Drug Allergies    4/22/22 No











MEDS


Medications:





Current Medications








 Medications


  (Trade)  Dose


 Ordered  Sig/Kim


 Route


 PRN Reason  Start Time


 Stop Time Status Last Admin


Dose Admin


 


 Sodium Chloride  1,000 ml @ 


 30 mls/hr  1X  ONCE


 IV


   4/22/22 23:00


 4/22/22 23:56 DC 4/22/22 22:33





 


 Sodium Chloride  1,000 ml @ 


 1,000 mls/hr  1X  ONCE


 IV


   4/23/22 00:30


 4/23/22 01:29 DC 4/22/22 22:00





 


 Sodium Chloride  1,000 ml @ 


 1,000 mls/hr  1X  ONCE


 IV


   4/23/22 00:30


 4/23/22 01:29 DC 4/23/22 00:02





 


 Famotidine


  (Pepcid Vial)  20 mg  1X  ONCE


 IVP


   4/23/22 00:30


 4/23/22 00:31 DC 4/23/22 00:09





 


 Sodium Chloride  1,000 ml @ 


 150 mls/hr  1X  ONCE


 IV


   4/23/22 01:00


 4/23/22 07:39 DC 4/23/22 01:11














LAB


Lab:





                                Laboratory Tests








Test


 4/22/22


22:25 4/23/22


07:15


 


White Blood Count


 15.8 x10^3/uL


(4.0-11.0)  H 





 


Red Blood Count


 2.12 x10^6/uL


(4.30-5.70)  L 





 


Hemoglobin


 6.3 g/dL


(13.0-17.5)  *L 7.6 g/dL


(13.0-17.5)  L


 


Hematocrit


 18.9 %


(39.0-53.0)  L 22.4 %


(39.0-53.0)  L


 


Mean Corpuscular Volume


 89 fL ()


 





 


Mean Corpuscular Hemoglobin 30 pg (25-35)   


 


Mean Corpuscular Hemoglobin


Concent 33 g/dL


(31-37) 34 g/dL


(31-37)


 


Red Cell Distribution Width


 20.1 %


(11.5-14.5)  H 





 


Platelet Count


 377 x10^3/uL


(140-400) 





 


Neutrophils (%) (Auto) 78 % (31-73)  H 


 


Lymphocytes (%) (Auto) 14 % (24-48)  L 


 


Monocytes (%) (Auto) 7 % (0-9)   


 


Eosinophils (%) (Auto) 1 % (0-3)   


 


Basophils (%) (Auto) 0 % (0-3)   


 


Neutrophils # (Auto)


 12.3 x10^3/uL


(1.8-7.7)  H 





 


Lymphocytes # (Auto)


 2.2 x10^3/uL


(1.0-4.8) 





 


Monocytes # (Auto)


 1.0 x10^3/uL


(0.0-1.1) 





 


Eosinophils # (Auto)


 0.1 x10^3/uL


(0.0-0.7) 





 


Basophils # (Auto)


 0.1 x10^3/uL


(0.0-0.2) 





 


Platelet Estimate


 Adequate


(ADEQUATE) 





 


Anisocytosis Mod   


 


Prothrombin Time


 16.4 SEC


(11.7-14.0)  H 





 


Prothrombin Time INR


 1.4 (0.8-1.1)


H 





 


Activated Partial


Thromboplast Time 35 SEC (24-38)


 





 


Sodium Level


 139 mmol/L


(136-145) 





 


Potassium Level


 4.4 mmol/L


(3.5-5.1) 





 


Chloride Level


 106 mmol/L


() 





 


Carbon Dioxide Level


 15 mmol/L


(21-32)  L 





 


Anion Gap 18 (6-14)  H 


 


Blood Urea Nitrogen


 55 mg/dL


(8-26)  H 





 


Creatinine


 1.8 mg/dL


(0.7-1.3)  H 





 


Estimated GFR


(Cockcroft-Gault) 38.9  


 





 


BUN/Creatinine Ratio 31 (6-20)  H 


 


Glucose Level


 159 mg/dL


(70-99)  H 





 


Calcium Level


 8.3 mg/dL


(8.5-10.1)  L 





 


Total Bilirubin


 0.4 mg/dL


(0.2-1.0) 





 


Aspartate Amino Transferase


(AST) 15 U/L (15-37)


 





 


Alanine Aminotransferase (ALT)


 23 U/L (16-63)


 





 


Alkaline Phosphatase


 65 U/L


() 





 


Total Protein


 5.6 g/dL


(6.4-8.2)  L 





 


Albumin


 1.9 g/dL


(3.4-5.0)  L 





 


Albumin/Globulin Ratio


 0.5 (1.0-1.7)


L 








                                Laboratory Tests


4/22/22 22:25








4/23/22 07:15








                                Laboratory Tests


4/22/22 22:25











Justifications for Admission


Other Justification














YVONNE DIAZ MD                Apr 23, 2022 11:17

## 2022-04-23 NOTE — HP
DATE OF SERVICE: 04/23/2022

ADMIT DATE: 04/22/2022

HISTORY OF PRESENT ILLNESS:  This patient is a 56-year-old man who is a 

continuity outpatient of Dr. Sánchez who uses the [facility] hospitalist here at 

[facility], I am rounding for them this weekend.  The patient is 

well known to this service with an admission 4 years ago with perforated 

diverticulitis and a remnant urachal cyst.  He was then seen again here earlier 

this month with acute inflammatory arthritis, thought to be secondary to gout.  

He had an admission at [facility] in late February with a bleeding ulcer.  There was

something wrong with the computer last evening and so he does not have an ER 

note in yet, but he tells me that he presented to the Emergency Department last 

night with 1 large hematochezia yesterday afternoon.  He is otherwise feeling 

fine.  He says he had no vomiting or nausea.  His appetite has been down and he 

does have occasional mid abdominal pain, was otherwise feeling at baseline.  His

inflammatory arthritis is improved.  He denies that he has not been taking any 

Motrin, Aleve, ibuprofen, or Naprosyn.  He is not sure of his home medication 

list.  All other systems reviewed and otherwise negative.



PAST MEDICAL HISTORY:

1.  As noted above, patient had an admission here in early April with acute 

inflammatory arthritis, mostly of his hands, thought to be secondary to a gouty 

flare.  He was treated for the same.  Those symptoms have resolved.

2.  History of significant alcoholism.  He tells me that he has quit drinking 

about 4 months ago.

3.  History of bleeding ulcer, believe he underwent upper endoscopy last at 
[facility] about 6 

weeks ago.



MEDICATIONS:  Please see the medication reconciliation form.



SOCIAL HISTORY:  The patient is single.  He lives independently in his own home.

 He is from [place] [facility] and all of his family lives out there.  He tells me

that he believes they are in good health.  He has quit drinking alcohol after 

many years of drinking.  He does smoke about a pack a day of tobacco.  He does 

not use any illicit drugs.



FAMILY HISTORY:  Reviewed in full and noncontributory to the present illness.



PHYSICAL EXAMINATION:

VITAL SIGNS:  Reviewed since admission and are notable for that the patient has 

been afebrile.  Blood pressure has been in the 90s/50s, heart rate is in the 

80s-90s and regular.  He is breathing comfortably and saturating normally on 

room air.

GENERAL:  The patient is a pleasant 56-year-old man, alert and oriented x3, in 

no acute distress.

HEENT:  Unremarkable for acute abnormality.

CHEST:  Clear to auscultation.

HEART:  S1, S2 normal.  Regular rate and rhythm.  No murmurs or gallops are 

noted.

ABDOMEN:  Soft, nontender, nondistended.  No masses or organomegaly noted.

EXTREMITIES:  Unremarkable for acute abnormality.



LABORATORY AND OTHER STUDIES:  Admission hemoglobin is 6.3.  He was transfused 1

unit of packed red blood cell, now up to 7.6.  He has not had any other episodes

of bleeding since admission.  White count is 15.8, platelet count is 377.  

Chemistry panel notable for creatinine of 1.8.  He is not sure of his baseline. 

Potassium is 4.4.  Sodium level is 139.  Liver function tests are normal.  INR 

is 1.4.



Chest x-ray is unremarkable for acute abnormality.  The last abdominal imaging 

at this institution was done in 2018.  Liver and pancreas were unremarkable.  

The remnant urachal bladder cyst was present at that time.  Diverticulosis was 

noted.



ASSESSMENT AND PLAN/IMPRESSION:  A 56-year-old man admitted with severe anemia 

secondary to lower gastrointestinal bleed.  He appears comfortable and stable at

this point.  GI has been consulted and their assistance is appreciated.  We will

have him on intravenous pantoprazole dose twice daily.  We will hold his diet 

until he is seen by GI with a clear plan.  Intravenous fluids have been ordered.

 We will order a repeat hemoglobin later on this afternoon.  We will use SCDs 

for deep venous thrombosis prophylaxis. He appears to be quite comfortable and 
clinically not consistent with 

diverticulitis.  We will hold off on empiric antibiotic coverage and followup on

his white count in the morning.  He may need abdominal imaging, but at this 

point given that he is otherwise comfortable, we will hold off on contrast with 

the acute renal failure, likely secondary to prerenal azotemia.  The patient is 

a full code.  He will need ICU monitoring at least for another 24 hours and then

depending on his progress, we can move him up to telemetry.  Inpatient stay is 

most appropriate as we anticipate a length of stay of at least 2-4 midnights 

while we work this through.







ACE/PRA/BRIDGETT BLANCO: ACE/lexi   DD: 04/23/2022 11:17

DT: 04/23/2022 12:50   TID: [id number]

CC:     JOSEPH SÁNCHEZ MD

[facility]

## 2022-04-23 NOTE — NUR
Asymptomatic this 12 H. Dr Allen in/ explanation of anemia w Rx suggested. Anxious to be 
up but eports back pain is a problem  is self limiting. cont poc

## 2022-04-23 NOTE — EKG
[facility]

              [address] [place] [address]

Test Date:    2022               Test Time:    22:20:53

Pat Name:     RUBY HAUSER              Department:   

Patient ID:   [id number]           Room:         111 1

Gender:       M                        Technician:   

:          1965               Requested By: RAMIRO DIAZ

Order Number: [id number]           Reading MD:   Toni Cox

                                 Measurements

Intervals                              Axis          

Rate:         120                      P:            38

AZ:           134                      QRS:          44

QRSD:         80                       T:            55

QT:           318                                    

QTc:          454                                    

                           Interpretive Statements

SINUS TACHYCARDIA

Electronically Signed On 2022 17:43:11 CDT by Toni Cox

## 2022-04-23 NOTE — EKG
[facility]

              [address] [place] [address]

Test Date:    2022               Test Time:    22:19:28

Pat Name:     RUBY HAUSER              Department:   

Patient ID:   [id number]           Room:         111 1

Gender:       M                        Technician:   

:          1965               Requested By: RAMIRO DIAZ

Order Number: [id number]           Reading MD:   Toni Cox

                                 Measurements

Intervals                              Axis          

Rate:         122                      P:            

PA:                                    QRS:          44

QRSD:         72                       T:            62

QT:           308                                    

QTc:          440                                    

                           Interpretive Statements

SINUS TACHYCARDIA

Electronically Signed On 2022 17:43:48 CDT by Toni Cox

## 2022-04-23 NOTE — PDOC2
GI CONSULT


Date of Service:


DATE: 4/23/22 


TIME: 12:26





Reason For Consult:


Anemia/GIB?





HPI:


HPI:


55 y/o male presented to ER after weakness and possible syncopal episode at 

home.  Noted to be anemic and we were asked to see.  Mentions dark stools, but 

"not like when I was admitted to [facility]".  Did use PeptoBismol on Wednesday for 

some non-specific dyspepsia.  No N, V, hematemesis nor hematochezia.  Admitted 

to [facility] late February into March with melena.  Initial EGD with DU/clot;

clot could not be washed off; next day, had second EGD with the DU w/o clot but 

with SRH.  Attempts to inject with epi difficult (indicates chronic ulcer with 

fibrotic base).  Lesion BICAP'd and clipped.  Says was not sent home on PPI nor 

iron.  Here couple of weeks ago and anemic then.  Had NO varices on EGD's.  





Denies heartburn or dysphagia.  Post-cholecystectomy.  Has had imaging c/w 

hepatic steatosis, but no cirrhosis.  Prior alcoholic pancreatitis.  Non-smoker.

 Has been alcohol abuser.  No diarrhea or constipation.  Says "normal" 

colonoscopy at [facility] 3-4 years ago.  





Last seen by us here in 2018 when admitted with perforated diverticulitis; had 

drain, but no resection.





PMH:


PMH:


HTN, GOUT, RA?, Urachal cyst.  S/P excision of urachal cyst and left ACL repair.





FH:


Family History:  No pertinent hx





Social History:


Smoke:  No


ALCOHOL:  none (Heavy in the past.)


Drugs:  None





ROS:





GEN: Denies fevers, chills, sweats


HEENT: Denies blurred vision, sore throat


CV: Denies chest pain


RESP: Denies shortness of air, cough


GI: Per HPI


: Denies hematuria, dysuria


ENDO: Denies weight changes


NEURO: Denies confusion, dizziness


MSK: Denies weakness, joint pain/swelling


SKIN: Denies jaundice, pruritus





Vitals:


Vitals:





                                   Vital Signs








  Date Time  Temp Pulse Resp B/P (MAP) Pulse Ox O2 Delivery O2 Flow Rate FiO2


 


4/23/22 07:00 98.9 88 19 98/56    





 98.9       


 


4/23/22 06:00     98 Room Air  











Labs:


Labs:





Laboratory Tests








Test


 4/22/22


22:25 4/23/22


07:15


 


White Blood Count


 15.8 x10^3/uL


(4.0-11.0) 





 


Red Blood Count


 2.12 x10^6/uL


(4.30-5.70) 





 


Hemoglobin


 6.3 g/dL


(13.0-17.5) 7.6 g/dL


(13.0-17.5)


 


Hematocrit


 18.9 %


(39.0-53.0) 22.4 %


(39.0-53.0)


 


Mean Corpuscular Volume 89 fL ()  


 


Mean Corpuscular Hemoglobin 30 pg (25-35)  


 


Mean Corpuscular Hemoglobin


Concent 33 g/dL


(31-37) 34 g/dL


(31-37)


 


Red Cell Distribution Width


 20.1 %


(11.5-14.5) 





 


Platelet Count


 377 x10^3/uL


(140-400) 





 


Neutrophils (%) (Auto) 78 % (31-73)  


 


Lymphocytes (%) (Auto) 14 % (24-48)  


 


Monocytes (%) (Auto) 7 % (0-9)  


 


Eosinophils (%) (Auto) 1 % (0-3)  


 


Basophils (%) (Auto) 0 % (0-3)  


 


Neutrophils # (Auto)


 12.3 x10^3/uL


(1.8-7.7) 





 


Lymphocytes # (Auto)


 2.2 x10^3/uL


(1.0-4.8) 





 


Monocytes # (Auto)


 1.0 x10^3/uL


(0.0-1.1) 





 


Eosinophils # (Auto)


 0.1 x10^3/uL


(0.0-0.7) 





 


Basophils # (Auto)


 0.1 x10^3/uL


(0.0-0.2) 





 


Platelet Estimate


 Adequate


(ADEQUATE) 





 


Anisocytosis Mod  


 


Prothrombin Time


 16.4 SEC


(11.7-14.0) 





 


Prothromb Time International


Ratio 1.4 (0.8-1.1) 


 





 


Activated Partial


Thromboplast Time 35 SEC (24-38) 


 





 


Sodium Level


 139 mmol/L


(136-145) 





 


Potassium Level


 4.4 mmol/L


(3.5-5.1) 





 


Chloride Level


 106 mmol/L


() 





 


Carbon Dioxide Level


 15 mmol/L


(21-32) 





 


Anion Gap 18 (6-14)  


 


Blood Urea Nitrogen


 55 mg/dL


(8-26) 





 


Creatinine


 1.8 mg/dL


(0.7-1.3) 





 


Estimated GFR


(Cockcroft-Gault) 38.9 


 





 


BUN/Creatinine Ratio 31 (6-20)  


 


Glucose Level


 159 mg/dL


(70-99) 





 


Calcium Level


 8.3 mg/dL


(8.5-10.1) 





 


Total Bilirubin


 0.4 mg/dL


(0.2-1.0) 





 


Aspartate Amino Transf


(AST/SGOT) 15 U/L (15-37) 


 





 


Alanine Aminotransferase


(ALT/SGPT) 23 U/L (16-63) 


 





 


Alkaline Phosphatase


 65 U/L


() 





 


Total Protein


 5.6 g/dL


(6.4-8.2) 





 


Albumin


 1.9 g/dL


(3.4-5.0) 





 


Albumin/Globulin Ratio 0.5 (1.0-1.7)  











Allergies:


Coded Allergies:  


     No Known Drug Allergies (Unverified , 4/22/22)





Medications:





Current Medications








 Medications


  (Trade)  Dose


 Ordered  Sig/Kim


 Route


 PRN Reason  Start Time


 Stop Time Status Last Admin


Dose Admin


 


 Sodium Chloride  1,000 ml @ 


 30 mls/hr  1X  ONCE


 IV


   4/22/22 23:00


 4/22/22 23:56 DC 4/22/22 22:33





 


 Sodium Chloride  1,000 ml @ 


 1,000 mls/hr  1X  ONCE


 IV


   4/23/22 00:30


 4/23/22 01:29 DC 4/22/22 22:00





 


 Sodium Chloride  1,000 ml @ 


 1,000 mls/hr  1X  ONCE


 IV


   4/23/22 00:30


 4/23/22 01:29 DC 4/23/22 00:02





 


 Famotidine


  (Pepcid Vial)  20 mg  1X  ONCE


 IVP


   4/23/22 00:30


 4/23/22 00:31 DC 4/23/22 00:09





 


 Sodium Chloride  1,000 ml @ 


 150 mls/hr  1X  ONCE


 IV


   4/23/22 01:00


 4/23/22 07:39 DC 4/23/22 01:11














PE:





GEN: NAD


HEENT: Atraumatic, PERRLA


LUNGS: CTAB


HEART: RRR, no murmurs


ABD: NABS, S/ND/NT, no masses


EXTREMITY: No edema


SKIN: No rashes, no jaundice


NEURO/PSYCH: A & O 3





A/P:


A/P:


IMP: Anemia; suspect residual from [facility] admission last month since not 

on iron.  Do not get history suggestive of active acute bleeding.


        Recent DU, by description chronic.  NOT apparently on PPI.


        S/p cholecystectomy.


        Hepatic steatosis on past imaging, likely from alcohol.


        Diverticulosis with prior diverticulitis/perf treated with drain but no 

resection.





REC:  PO PPI BID.


          OK to try diet.


          Observe for bleeding.


          Transfuse prn.





--other pending.  Thanks.











RAMIRO LOPEZ MD          Apr 23, 2022 12:43

## 2022-04-24 VITALS — DIASTOLIC BLOOD PRESSURE: 61 MMHG | SYSTOLIC BLOOD PRESSURE: 114 MMHG

## 2022-04-24 VITALS — DIASTOLIC BLOOD PRESSURE: 73 MMHG | SYSTOLIC BLOOD PRESSURE: 131 MMHG

## 2022-04-24 VITALS — SYSTOLIC BLOOD PRESSURE: 127 MMHG | DIASTOLIC BLOOD PRESSURE: 79 MMHG

## 2022-04-24 VITALS — SYSTOLIC BLOOD PRESSURE: 145 MMHG | DIASTOLIC BLOOD PRESSURE: 76 MMHG

## 2022-04-24 VITALS — SYSTOLIC BLOOD PRESSURE: 114 MMHG | DIASTOLIC BLOOD PRESSURE: 62 MMHG

## 2022-04-24 VITALS — DIASTOLIC BLOOD PRESSURE: 63 MMHG | SYSTOLIC BLOOD PRESSURE: 122 MMHG

## 2022-04-24 VITALS — DIASTOLIC BLOOD PRESSURE: 67 MMHG | SYSTOLIC BLOOD PRESSURE: 120 MMHG

## 2022-04-24 VITALS — SYSTOLIC BLOOD PRESSURE: 135 MMHG | DIASTOLIC BLOOD PRESSURE: 79 MMHG

## 2022-04-24 VITALS — DIASTOLIC BLOOD PRESSURE: 79 MMHG | SYSTOLIC BLOOD PRESSURE: 127 MMHG

## 2022-04-24 VITALS — DIASTOLIC BLOOD PRESSURE: 70 MMHG | SYSTOLIC BLOOD PRESSURE: 123 MMHG

## 2022-04-24 VITALS — DIASTOLIC BLOOD PRESSURE: 60 MMHG | SYSTOLIC BLOOD PRESSURE: 128 MMHG

## 2022-04-24 VITALS — SYSTOLIC BLOOD PRESSURE: 116 MMHG | DIASTOLIC BLOOD PRESSURE: 63 MMHG

## 2022-04-24 VITALS — DIASTOLIC BLOOD PRESSURE: 74 MMHG | SYSTOLIC BLOOD PRESSURE: 121 MMHG

## 2022-04-24 VITALS — DIASTOLIC BLOOD PRESSURE: 65 MMHG | SYSTOLIC BLOOD PRESSURE: 133 MMHG

## 2022-04-24 VITALS — DIASTOLIC BLOOD PRESSURE: 61 MMHG | SYSTOLIC BLOOD PRESSURE: 111 MMHG

## 2022-04-24 VITALS — DIASTOLIC BLOOD PRESSURE: 74 MMHG | SYSTOLIC BLOOD PRESSURE: 140 MMHG

## 2022-04-24 VITALS — DIASTOLIC BLOOD PRESSURE: 64 MMHG | SYSTOLIC BLOOD PRESSURE: 132 MMHG

## 2022-04-24 VITALS — SYSTOLIC BLOOD PRESSURE: 121 MMHG | DIASTOLIC BLOOD PRESSURE: 63 MMHG

## 2022-04-24 VITALS — SYSTOLIC BLOOD PRESSURE: 122 MMHG | DIASTOLIC BLOOD PRESSURE: 63 MMHG

## 2022-04-24 VITALS — SYSTOLIC BLOOD PRESSURE: 119 MMHG | DIASTOLIC BLOOD PRESSURE: 77 MMHG

## 2022-04-24 VITALS — DIASTOLIC BLOOD PRESSURE: 67 MMHG | SYSTOLIC BLOOD PRESSURE: 116 MMHG

## 2022-04-24 VITALS — DIASTOLIC BLOOD PRESSURE: 78 MMHG | SYSTOLIC BLOOD PRESSURE: 135 MMHG

## 2022-04-24 LAB
ALBUMIN SERPL-MCNC: 1.8 G/DL (ref 3.4–5)
ALBUMIN/GLOB SERPL: 0.6 {RATIO} (ref 1–1.7)
ALP SERPL-CCNC: 52 U/L (ref 46–116)
ALT SERPL-CCNC: 14 U/L (ref 16–63)
ANION GAP SERPL CALC-SCNC: 9 MMOL/L (ref 6–14)
AST SERPL-CCNC: 13 U/L (ref 15–37)
BASOPHILS # BLD AUTO: 0 X10^3/UL (ref 0–0.2)
BASOPHILS NFR BLD: 1 % (ref 0–3)
BILIRUB SERPL-MCNC: 0.5 MG/DL (ref 0.2–1)
BUN SERPL-MCNC: 25 MG/DL (ref 8–26)
BUN/CREAT SERPL: 23 (ref 6–20)
CALCIUM SERPL-MCNC: 7.8 MG/DL (ref 8.5–10.1)
CHLORIDE SERPL-SCNC: 113 MMOL/L (ref 98–107)
CO2 SERPL-SCNC: 17 MMOL/L (ref 21–32)
CREAT SERPL-MCNC: 1.1 MG/DL (ref 0.7–1.3)
EOSINOPHIL NFR BLD: 0.1 X10^3/UL (ref 0–0.7)
EOSINOPHIL NFR BLD: 2 % (ref 0–3)
ERYTHROCYTE [DISTWIDTH] IN BLOOD BY AUTOMATED COUNT: 20.1 % (ref 11.5–14.5)
GFR SERPLBLD BASED ON 1.73 SQ M-ARVRAT: 68.8 ML/MIN
GLUCOSE SERPL-MCNC: 91 MG/DL (ref 70–99)
HCT VFR BLD CALC: 19.6 % (ref 39–53)
HGB BLD-MCNC: 6.7 G/DL (ref 13–17.5)
LYMPHOCYTES # BLD: 1.2 X10^3/UL (ref 1–4.8)
LYMPHOCYTES NFR BLD AUTO: 21 % (ref 24–48)
MCH RBC QN AUTO: 30 PG (ref 25–35)
MCHC RBC AUTO-ENTMCNC: 34 G/DL (ref 31–37)
MCV RBC AUTO: 87 FL (ref 79–100)
MONO #: 0.3 X10^3/UL (ref 0–1.1)
MONOCYTES NFR BLD: 6 % (ref 0–9)
NEUT #: 4 X10^3/UL (ref 1.8–7.7)
NEUTROPHILS NFR BLD AUTO: 71 % (ref 31–73)
PLATELET # BLD AUTO: 180 X10^3/UL (ref 140–400)
POTASSIUM SERPL-SCNC: 3.9 MMOL/L (ref 3.5–5.1)
PROT SERPL-MCNC: 4.7 G/DL (ref 6.4–8.2)
RBC # BLD AUTO: 2.26 X10^6/UL (ref 4.3–5.7)
SODIUM SERPL-SCNC: 139 MMOL/L (ref 136–145)
WBC # BLD AUTO: 5.7 X10^3/UL (ref 4–11)

## 2022-04-24 RX ADMIN — ZOLPIDEM TARTRATE PRN MG: 5 TABLET ORAL at 22:01

## 2022-04-24 RX ADMIN — PANTOPRAZOLE SODIUM SCH MG: 40 TABLET, DELAYED RELEASE ORAL at 09:15

## 2022-04-24 RX ADMIN — PANTOPRAZOLE SODIUM SCH MG: 40 TABLET, DELAYED RELEASE ORAL at 18:44

## 2022-04-24 RX ADMIN — BACITRACIN SCH MLS/HR: 5000 INJECTION, POWDER, FOR SOLUTION INTRAMUSCULAR at 00:26

## 2022-04-24 RX ADMIN — BACITRACIN SCH MLS/HR: 5000 INJECTION, POWDER, FOR SOLUTION INTRAMUSCULAR at 09:15

## 2022-04-24 NOTE — NUR
1 unit blood infused per order. Questions answered by Dr Allen explaining anemia,Rx & pos 
cause of low Hct( dilution RT increase IVF mentioned) this am. Reluctant to be OOB. Informed 
patient of trans to [facility] bed. Will let   know when bed available. Diet to remain same 4/24, 
unhappy .IVF stopped per order.

## 2022-04-24 NOTE — PDOC
G I PROGRESS NOTE


Subjective


No complaints.  Denies any overt bleeding.  Staff note no bleeding.


Physical Exam


Lungs clear anteriorly.


RRR


Abdomen soft, not tender nor distended.


Review of Relevant


I have reviewed the following items freddy (where applicable) has been applied.


Labs





Laboratory Tests








Test


 4/22/22


22:25 4/23/22


07:15 4/23/22


14:00 4/24/22


04:30


 


White Blood Count


 15.8 x10^3/uL


(4.0-11.0) 


 


 5.7 x10^3/uL


(4.0-11.0)


 


Red Blood Count


 2.12 x10^6/uL


(4.30-5.70) 


 


 2.26 x10^6/uL


(4.30-5.70)


 


Hemoglobin


 6.3 g/dL


(13.0-17.5) 7.6 g/dL


(13.0-17.5) 8.1 g/dL


(13.0-17.5) 6.7 g/dL


(13.0-17.5)


 


Hematocrit


 18.9 %


(39.0-53.0) 22.4 %


(39.0-53.0) 


 19.6 %


(39.0-53.0)


 


Mean Corpuscular Volume 89 fL ()    87 fL () 


 


Mean Corpuscular Hemoglobin 30 pg (25-35)    30 pg (25-35) 


 


Mean Corpuscular Hemoglobin


Concent 33 g/dL


(31-37) 34 g/dL


(31-37) 


 34 g/dL


(31-37)


 


Red Cell Distribution Width


 20.1 %


(11.5-14.5) 


 


 20.1 %


(11.5-14.5)


 


Platelet Count


 377 x10^3/uL


(140-400) 


 


 180 x10^3/uL


(140-400)


 


Neutrophils (%) (Auto) 78 % (31-73)    71 % (31-73) 


 


Lymphocytes (%) (Auto) 14 % (24-48)    21 % (24-48) 


 


Monocytes (%) (Auto) 7 % (0-9)    6 % (0-9) 


 


Eosinophils (%) (Auto) 1 % (0-3)    2 % (0-3) 


 


Basophils (%) (Auto) 0 % (0-3)    1 % (0-3) 


 


Neutrophils # (Auto)


 12.3 x10^3/uL


(1.8-7.7) 


 


 4.0 x10^3/uL


(1.8-7.7)


 


Lymphocytes # (Auto)


 2.2 x10^3/uL


(1.0-4.8) 


 


 1.2 x10^3/uL


(1.0-4.8)


 


Monocytes # (Auto)


 1.0 x10^3/uL


(0.0-1.1) 


 


 0.3 x10^3/uL


(0.0-1.1)


 


Eosinophils # (Auto)


 0.1 x10^3/uL


(0.0-0.7) 


 


 0.1 x10^3/uL


(0.0-0.7)


 


Basophils # (Auto)


 0.1 x10^3/uL


(0.0-0.2) 


 


 0.0 x10^3/uL


(0.0-0.2)


 


Platelet Estimate


 Adequate


(ADEQUATE) 


 


 





 


Anisocytosis Mod    


 


Prothrombin Time


 16.4 SEC


(11.7-14.0) 


 


 





 


Prothromb Time International


Ratio 1.4 (0.8-1.1) 


 


 


 





 


Activated Partial


Thromboplast Time 35 SEC (24-38) 


 


 


 





 


Sodium Level


 139 mmol/L


(136-145) 


 


 139 mmol/L


(136-145)


 


Potassium Level


 4.4 mmol/L


(3.5-5.1) 


 


 3.9 mmol/L


(3.5-5.1)


 


Chloride Level


 106 mmol/L


() 


 


 113 mmol/L


()


 


Carbon Dioxide Level


 15 mmol/L


(21-32) 


 


 17 mmol/L


(21-32)


 


Anion Gap 18 (6-14)    9 (6-14) 


 


Blood Urea Nitrogen


 55 mg/dL


(8-26) 


 


 25 mg/dL


(8-26)


 


Creatinine


 1.8 mg/dL


(0.7-1.3) 


 


 1.1 mg/dL


(0.7-1.3)


 


Estimated GFR


(Cockcroft-Gault) 38.9 


 


 


 68.8 





 


BUN/Creatinine Ratio 31 (6-20)    23 (6-20) 


 


Glucose Level


 159 mg/dL


(70-99) 


 


 91 mg/dL


(70-99)


 


Calcium Level


 8.3 mg/dL


(8.5-10.1) 


 


 7.8 mg/dL


(8.5-10.1)


 


Total Bilirubin


 0.4 mg/dL


(0.2-1.0) 


 


 0.5 mg/dL


(0.2-1.0)


 


Aspartate Amino Transf


(AST/SGOT) 15 U/L (15-37) 


 


 


 13 U/L (15-37) 





 


Alanine Aminotransferase


(ALT/SGPT) 23 U/L (16-63) 


 


 


 14 U/L (16-63) 





 


Alkaline Phosphatase


 65 U/L


() 


 


 52 U/L


()


 


Total Protein


 5.6 g/dL


(6.4-8.2) 


 


 4.7 g/dL


(6.4-8.2)


 


Albumin


 1.9 g/dL


(3.4-5.0) 


 


 1.8 g/dL


(3.4-5.0)


 


Albumin/Globulin Ratio 0.5 (1.0-1.7)    0.6 (1.0-1.7) 








Laboratory Tests








Test


 4/23/22


14:00 4/24/22


04:30


 


Hemoglobin


 8.1 g/dL


(13.0-17.5) 6.7 g/dL


(13.0-17.5)


 


White Blood Count


 


 5.7 x10^3/uL


(4.0-11.0)


 


Red Blood Count


 


 2.26 x10^6/uL


(4.30-5.70)


 


Hematocrit


 


 19.6 %


(39.0-53.0)


 


Mean Corpuscular Volume  87 fL () 


 


Mean Corpuscular Hemoglobin  30 pg (25-35) 


 


Mean Corpuscular Hemoglobin


Concent 


 34 g/dL


(31-37)


 


Red Cell Distribution Width


 


 20.1 %


(11.5-14.5)


 


Platelet Count


 


 180 x10^3/uL


(140-400)


 


Neutrophils (%) (Auto)  71 % (31-73) 


 


Lymphocytes (%) (Auto)  21 % (24-48) 


 


Monocytes (%) (Auto)  6 % (0-9) 


 


Eosinophils (%) (Auto)  2 % (0-3) 


 


Basophils (%) (Auto)  1 % (0-3) 


 


Neutrophils # (Auto)


 


 4.0 x10^3/uL


(1.8-7.7)


 


Lymphocytes # (Auto)


 


 1.2 x10^3/uL


(1.0-4.8)


 


Monocytes # (Auto)


 


 0.3 x10^3/uL


(0.0-1.1)


 


Eosinophils # (Auto)


 


 0.1 x10^3/uL


(0.0-0.7)


 


Basophils # (Auto)


 


 0.0 x10^3/uL


(0.0-0.2)


 


Sodium Level


 


 139 mmol/L


(136-145)


 


Potassium Level


 


 3.9 mmol/L


(3.5-5.1)


 


Chloride Level


 


 113 mmol/L


()


 


Carbon Dioxide Level


 


 17 mmol/L


(21-32)


 


Anion Gap  9 (6-14) 


 


Blood Urea Nitrogen


 


 25 mg/dL


(8-26)


 


Creatinine


 


 1.1 mg/dL


(0.7-1.3)


 


Estimated GFR


(Cockcroft-Gault) 


 68.8 





 


BUN/Creatinine Ratio  23 (6-20) 


 


Glucose Level


 


 91 mg/dL


(70-99)


 


Calcium Level


 


 7.8 mg/dL


(8.5-10.1)


 


Total Bilirubin


 


 0.5 mg/dL


(0.2-1.0)


 


Aspartate Amino Transf


(AST/SGOT) 


 13 U/L (15-37) 





 


Alanine Aminotransferase


(ALT/SGPT) 


 14 U/L (16-63) 





 


Alkaline Phosphatase


 


 52 U/L


()


 


Total Protein


 


 4.7 g/dL


(6.4-8.2)


 


Albumin


 


 1.8 g/dL


(3.4-5.0)


 


Albumin/Globulin Ratio  0.6 (1.0-1.7) 














Some drop in hemoglobin; suspect dilutional.


Vitals/I & O





Vital Sign - Last 24 Hours








 4/23/22 4/23/22 4/23/22 4/23/22





 13:00 14:00 15:00 16:00


 


Pulse 96 88 96 


 


Resp 18 16 20 


 


B/P (MAP) 97/55 (69) 85/59 (68) 113/65 (81) 


 


Pulse Ox 100 100 100 


 


O2 Delivery Room Air Room Air Room Air Room Air





 4/23/22 4/23/22 4/23/22 4/23/22





 16:00 17:00 18:11 18:12


 


Pulse 98 80 94 


 


Resp 20 18 18 


 


B/P (MAP) 115/55 (75) 109/57 (74) 129/82 (98) 


 


Pulse Ox 99 98 100 


 


O2 Delivery Room Air  Room Air Room Air





 4/23/22 4/23/22 4/23/22 4/23/22





 19:00 20:00 20:48 21:00


 


Temp  98.8  





  98.8  


 


Pulse 81 81  85


 


Resp 18 18  18


 


B/P (MAP) 129/73 (91) 119/72 (88)  108/56 (73)


 


Pulse Ox 100 98  100


 


O2 Delivery Room Air Room Air Room Air Room Air


 


    





    





 4/23/22 4/23/22 4/24/22 4/24/22





 22:00 23:00 00:00 00:00


 


Temp    98.5





    98.5


 


Pulse 85 86  82


 


Resp 18 20  18


 


B/P (MAP) 110/63 (79) 99/55 (70)  114/62 (79)


 


Pulse Ox 99 99  100


 


O2 Delivery Room Air Room Air Room Air Room Air


 


    





    





 4/24/22 4/24/22 4/24/22 4/24/22





 01:00 02:00 03:00 04:00


 


Pulse 84 88 86 


 


Resp 18 18 18 


 


B/P (MAP) 116/63 (80) 128/60 (82) 121/63 (82) 


 


Pulse Ox 98 97 98 


 


O2 Delivery Room Air Room Air Room Air Room Air





 4/24/22 4/24/22 4/24/22 4/24/22





 04:00 05:00 06:00 08:00


 


Temp 98.4   





 98.4   


 


Pulse 84 86 88 


 


Resp 18 18 18 


 


B/P (MAP) 127/79 (95) 114/61 (78) 111/61 (78) 


 


Pulse Ox 98 99 99 


 


O2 Delivery Room Air Room Air Room Air Room Air


 


    





    





 4/24/22 4/24/22  





 10:53 11:16  


 


Temp 98.0   





 98.0   


 


Pulse 81 84  


 


Resp 14 16  


 


B/P (MAP) 122/63 127/79  














Intake and Output   


 


 4/23/22 4/23/22 4/24/22





 15:00 23:00 07:00


 


Intake Total 240 ml 500 ml 1001 ml


 


Output Total 600 ml 550 ml 500 ml


 


Balance -360 ml -50 ml 501 ml








Problem List


Problems


Medical Problems:


(1) Acute GI bleeding


Status: Acute  





(2) Syncope


Status: Acute  





Assessment


Anemia; no sgns ongoing bleeding.


DU; probably still present as untreated.


Plan of Care Note


Continue BID PPI.


OK for out of ICU.


Would leave on clears another day.


If continues to take PO well, probably no need for IVF's.





Justicifation of Admission Dx:


Justifications for Admission:


Justification of Admission Dx:  Yes











RAMIRO LOPEZ MD          Apr 24, 2022 12:20

## 2022-04-24 NOTE — PDOC
GENERAL


General:


Patient examined chart reviewed no events overnight noted.  Patient denies any 

active bleeding.  Hemoglobin dropped from 8.1 yesterday afternoon to 6.7 this 

morning which may be equilibration.  He was transfused 1 unit early this 

morning.  He is frustrated by his situation because feels fine.  We appreciate 

GI consult.  They are advancing diet as tolerated.  Patient tells me he feels 

better when he eats which is consistent with his diagnosis of known duodenal 

ulcer.  He does not remember being discharged from [facility] on ulcer regimen.  We 

will need to hold his aspirin for several weeks until this heals.  He is clear 

to move up to the telemetry unit.  Repeat labs ordered for the morning.


Problems:  


(1) Acute GI bleeding


(2) Duodenal ulcer





VITAL SIGNS


Vital Signs/I&O:





                                   Vital Signs








  Date Time  Temp Pulse Resp B/P (MAP) Pulse Ox O2 Delivery O2 Flow Rate FiO2


 


4/24/22 11:16  84 16 127/79    


 


4/24/22 10:53 98.0       





 98.0       


 


4/24/22 08:00      Room Air  


 


4/24/22 06:00     99   














                                    I & O   


 


 4/23/22 4/23/22 4/24/22





 15:00 23:00 07:00


 


Intake Total 240 ml 500 ml 1001 ml


 


Output Total 600 ml 550 ml 500 ml


 


Balance -360 ml -50 ml 501 ml





In general patient is sitting up in his bed resting comfortably without 

complaint this afternoon


HEENT exam is unremarkable for acute abnormality


Neck is soft and supple no adenopathy or thyromegaly noted


Chest is clear to auscultation


Heart S1-S2 normal regular rate and rhythm no murmurs or gallops are noted


Abdomen soft nontender nondistended no masses organomegaly noted


Extremity exam is unremarkable for acute abnormality





ALLERGIES


Allergies:





Allergies








Coded Allergies Type Severity Reaction Last Updated Verified


 


  No Known Drug Allergies    4/22/22 No











MEDS


Medications:





Current Medications








 Medications


  (Trade)  Dose


 Ordered  Sig/Kim  Start Time


 Stop Time Status Last Admin


Dose Admin


 


 Dexamethasone


 Sodium Phosphate


  (Decadron)  4 mg  STK-MED ONCE  4/24/22 07:00


 4/24/22 07:01 DC  





 


 Famotidine


  (Pepcid Vial)  20 mg  1X  ONCE  4/23/22 00:30


 4/23/22 00:31 DC 4/23/22 00:09





 


 Fentanyl Citrate


  (Fentanyl 2ml


 Vial)  100 mcg  STK-MED ONCE  4/24/22 07:01


 4/24/22 07:01 DC  





 


 Glycopyrrolate


  (Robinul)  1 mg  STK-MED ONCE  4/24/22 07:01


 4/24/22 07:02 DC  





 


 Lidocaine HCl


  (Lidocaine Pf 2%


 Vial)  5 ml  STK-MED ONCE  4/24/22 07:00


 4/24/22 07:01 DC  





 


 Midazolam HCl


  (Versed)  2 mg  STK-MED ONCE  4/24/22 07:01


 4/24/22 07:02 DC  





 


 Neostigmine


 Bromide


  (Neostigmine


 Methylsulfate)  5 mg  STK-MED ONCE  4/24/22 07:01


 4/24/22 07:01 DC  





 


 Ondansetron HCl


  (Zofran)  4 mg  STK-MED ONCE  4/24/22 07:00


 4/24/22 07:01 DC  





 


 Pantoprazole


 Sodium


  (PROTONIX VIAL


 for IV PUSH)  40 mg  BID  4/23/22 11:00


 4/23/22 12:45 DC  





 


 Pantoprazole


 Sodium


  (Protonix)  40 mg  BID  AC  4/23/22 17:30


    4/24/22 09:15





 


 Propofol


  (Diprivan)  200 mg  STK-MED ONCE  4/24/22 07:00


 4/24/22 07:01 DC  





 


 Rocuronium Bromide


  (Zemuron)  50 mg  STK-MED ONCE  4/24/22 07:01


 4/24/22 07:01 DC  





 


 Sodium Chloride  1,000 ml @ 


 100 mls/hr  Q10H  4/23/22 11:00


    4/24/22 09:15





 


 Succinylcholine


 Chloride


  (Anectine)  200 mg  STK-MED ONCE  4/24/22 07:00


 4/24/22 07:01 DC  











Current Medications








 Medications


  (Trade)  Dose


 Ordered  Sig/Kim


 Route


 PRN Reason  Start Time


 Stop Time Status Last Admin


Dose Admin


 


 Pantoprazole


 Sodium


  (Protonix)  40 mg  BID  AC


 PO


   4/23/22 17:30


    4/24/22 09:15














LAB


Lab:





                                Laboratory Tests








Test


 4/24/22


04:30


 


White Blood Count


 5.7 x10^3/uL


(4.0-11.0)


 


Red Blood Count


 2.26 x10^6/uL


(4.30-5.70)  L


 


Hemoglobin


 6.7 g/dL


(13.0-17.5)  *L


 


Hematocrit


 19.6 %


(39.0-53.0)  L


 


Mean Corpuscular Volume


 87 fL ()





 


Mean Corpuscular Hemoglobin 30 pg (25-35)  


 


Mean Corpuscular Hemoglobin


Concent 34 g/dL


(31-37)


 


Red Cell Distribution Width


 20.1 %


(11.5-14.5)  H


 


Platelet Count


 180 x10^3/uL


(140-400)


 


Neutrophils (%) (Auto) 71 % (31-73)  


 


Lymphocytes (%) (Auto) 21 % (24-48)  L


 


Monocytes (%) (Auto) 6 % (0-9)  


 


Eosinophils (%) (Auto) 2 % (0-3)  


 


Basophils (%) (Auto) 1 % (0-3)  


 


Neutrophils # (Auto)


 4.0 x10^3/uL


(1.8-7.7)


 


Lymphocytes # (Auto)


 1.2 x10^3/uL


(1.0-4.8)


 


Monocytes # (Auto)


 0.3 x10^3/uL


(0.0-1.1)


 


Eosinophils # (Auto)


 0.1 x10^3/uL


(0.0-0.7)


 


Basophils # (Auto)


 0.0 x10^3/uL


(0.0-0.2)


 


Sodium Level


 139 mmol/L


(136-145)


 


Potassium Level


 3.9 mmol/L


(3.5-5.1)


 


Chloride Level


 113 mmol/L


()  H


 


Carbon Dioxide Level


 17 mmol/L


(21-32)  L


 


Anion Gap 9 (6-14)  


 


Blood Urea Nitrogen


 25 mg/dL


(8-26)


 


Creatinine


 1.1 mg/dL


(0.7-1.3)


 


Estimated GFR


(Cockcroft-Gault) 68.8  





 


BUN/Creatinine Ratio 23 (6-20)  H


 


Glucose Level


 91 mg/dL


(70-99)


 


Calcium Level


 7.8 mg/dL


(8.5-10.1)  L


 


Total Bilirubin


 0.5 mg/dL


(0.2-1.0)


 


Aspartate Amino Transferase


(AST) 13 U/L (15-37)


L


 


Alanine Aminotransferase (ALT)


 14 U/L (16-63)


L


 


Alkaline Phosphatase


 52 U/L


()


 


Total Protein


 4.7 g/dL


(6.4-8.2)  L


 


Albumin


 1.8 g/dL


(3.4-5.0)  L


 


Albumin/Globulin Ratio


 0.6 (1.0-1.7)


L





                                Laboratory Tests


4/24/22 04:30








                                Laboratory Tests


4/24/22 04:30











ASSESSMENT & PLAN


A&P


Plan as noted above








This note was created using [employer] and may have omissions and/or 

errors due to the nature of real-time voice transcription.





Justifications for Admission


Other Justification














YVONNE DIAZ MD                Apr 24, 2022 15:41

## 2022-04-25 VITALS — SYSTOLIC BLOOD PRESSURE: 112 MMHG | DIASTOLIC BLOOD PRESSURE: 79 MMHG

## 2022-04-25 VITALS — SYSTOLIC BLOOD PRESSURE: 128 MMHG | DIASTOLIC BLOOD PRESSURE: 79 MMHG

## 2022-04-25 VITALS — SYSTOLIC BLOOD PRESSURE: 126 MMHG | DIASTOLIC BLOOD PRESSURE: 81 MMHG

## 2022-04-25 VITALS — SYSTOLIC BLOOD PRESSURE: 123 MMHG | DIASTOLIC BLOOD PRESSURE: 83 MMHG

## 2022-04-25 VITALS — SYSTOLIC BLOOD PRESSURE: 111 MMHG | DIASTOLIC BLOOD PRESSURE: 75 MMHG

## 2022-04-25 VITALS — SYSTOLIC BLOOD PRESSURE: 117 MMHG | DIASTOLIC BLOOD PRESSURE: 64 MMHG

## 2022-04-25 VITALS — SYSTOLIC BLOOD PRESSURE: 108 MMHG | DIASTOLIC BLOOD PRESSURE: 61 MMHG

## 2022-04-25 LAB
ALBUMIN SERPL-MCNC: 1.8 G/DL (ref 3.4–5)
ALBUMIN/GLOB SERPL: 0.5 {RATIO} (ref 1–1.7)
ALP SERPL-CCNC: 64 U/L (ref 46–116)
ALT SERPL-CCNC: 17 U/L (ref 16–63)
ANION GAP SERPL CALC-SCNC: 11 MMOL/L (ref 6–14)
AST SERPL-CCNC: 10 U/L (ref 15–37)
BASOPHILS # BLD AUTO: 0 X10^3/UL (ref 0–0.2)
BASOPHILS NFR BLD: 0 % (ref 0–3)
BILIRUB SERPL-MCNC: 0.7 MG/DL (ref 0.2–1)
BUN SERPL-MCNC: 15 MG/DL (ref 8–26)
BUN/CREAT SERPL: 14 (ref 6–20)
CALCIUM SERPL-MCNC: 8.1 MG/DL (ref 8.5–10.1)
CHLORIDE SERPL-SCNC: 110 MMOL/L (ref 98–107)
CO2 SERPL-SCNC: 18 MMOL/L (ref 21–32)
CREAT SERPL-MCNC: 1.1 MG/DL (ref 0.7–1.3)
EOSINOPHIL NFR BLD: 0.1 X10^3/UL (ref 0–0.7)
EOSINOPHIL NFR BLD: 2 % (ref 0–3)
ERYTHROCYTE [DISTWIDTH] IN BLOOD BY AUTOMATED COUNT: 18.2 % (ref 11.5–14.5)
GFR SERPLBLD BASED ON 1.73 SQ M-ARVRAT: 68.8 ML/MIN
GLUCOSE SERPL-MCNC: 92 MG/DL (ref 70–99)
HCT VFR BLD CALC: 26.5 % (ref 39–53)
HGB BLD-MCNC: 9.1 G/DL (ref 13–17.5)
LYMPHOCYTES # BLD: 0.9 X10^3/UL (ref 1–4.8)
LYMPHOCYTES NFR BLD AUTO: 16 % (ref 24–48)
MCH RBC QN AUTO: 30 PG (ref 25–35)
MCHC RBC AUTO-ENTMCNC: 34 G/DL (ref 31–37)
MCV RBC AUTO: 87 FL (ref 79–100)
MONO #: 0.4 X10^3/UL (ref 0–1.1)
MONOCYTES NFR BLD: 7 % (ref 0–9)
NEUT #: 4.3 X10^3/UL (ref 1.8–7.7)
NEUTROPHILS NFR BLD AUTO: 75 % (ref 31–73)
PLATELET # BLD AUTO: 172 X10^3/UL (ref 140–400)
POTASSIUM SERPL-SCNC: 3.5 MMOL/L (ref 3.5–5.1)
PROT SERPL-MCNC: 5.1 G/DL (ref 6.4–8.2)
RBC # BLD AUTO: 3.06 X10^6/UL (ref 4.3–5.7)
SODIUM SERPL-SCNC: 139 MMOL/L (ref 136–145)
WBC # BLD AUTO: 5.8 X10^3/UL (ref 4–11)

## 2022-04-25 RX ADMIN — PANTOPRAZOLE SODIUM SCH MG: 40 TABLET, DELAYED RELEASE ORAL at 17:18

## 2022-04-25 RX ADMIN — ZOLPIDEM TARTRATE PRN MG: 5 TABLET ORAL at 20:39

## 2022-04-25 RX ADMIN — METOPROLOL SUCCINATE SCH MG: 50 TABLET, EXTENDED RELEASE ORAL at 07:54

## 2022-04-25 RX ADMIN — PANTOPRAZOLE SODIUM SCH MG: 40 TABLET, DELAYED RELEASE ORAL at 06:15

## 2022-04-25 NOTE — PDOC
[facility] PROGRESS NOTE


Date of Service


DOS:


DATE: 4/25/22 


TIME: 07:51





Chief Complaint


Chief Complaint


Acute Anemia - likely rebleed


Peptic ulcer disease - with duodenal ulcer that sounds to be chronic


Hepatic steatosis 


Diverticulosis





History of Present Illness


History of Present Illness


Mr Bernal is a 57 y/o male presented to ER after weakness and possible syncopal 

episode at home. Having some dark stools, notably admitted to [facility] 

late February into March with melena.  Initial EGD with duodenal ulcer and clot,

unable to wash off or inject with epinephrine.  Despite 2 EGDs. No varices on 

EGD's.  


Did use PeptoBismol on Wednesday for some non-specific dyspepsia.  No N, V, 

hematemesis nor hematochezia. Has had imaging c/w hepatic steatosis, but no 

cirrhosis.  Prior alcoholic pancreatitis.  Non-smoker.  Has been alcohol abuser,

but quit 4 months ago.  No diarrhea or constipation. 





Last seen by us here in 2018 when admitted with perforated diverticulitis; had 

drain, but no resection.


Still with Hb of 6.3 WBC 15.8.  Typed and screened and admitted to ICU for 

further care with gastroenterology consultation





4/25:  Hb 9.1 CR 1.1.  No dizziness at this time.  Seen bedside in ICU.  Asking 

for more to eat





Vitals/I&O


Vitals/I&O:





                                   Vital Signs








  Date Time  Temp Pulse Resp B/P (MAP) Pulse Ox O2 Delivery O2 Flow Rate FiO2


 


4/25/22 07:50 98.6 90 18 112/79 (90) 99 Room Air  





 98.6       














                                    I & O   


 


 4/24/22 4/24/22 4/25/22





 15:00 23:00 07:00


 


Intake Total 920 ml 120 ml 0 ml


 


Output Total 300 ml 620 ml 750 ml


 


Balance 620 ml -500 ml -750 ml











Physical Exam


General:  Alert, Oriented X3, Cooperative


Heart:  Regular rate


Lungs:  Clear





Labs


Labs:





Laboratory Tests








Test


 4/25/22


04:50


 


White Blood Count


 5.8 x10^3/uL


(4.0-11.0)


 


Red Blood Count


 3.06 x10^6/uL


(4.30-5.70)


 


Hemoglobin


 9.1 g/dL


(13.0-17.5)


 


Hematocrit


 26.5 %


(39.0-53.0)


 


Mean Corpuscular Volume 87 fL () 


 


Mean Corpuscular Hemoglobin 30 pg (25-35) 


 


Mean Corpuscular Hemoglobin


Concent 34 g/dL


(31-37)


 


Red Cell Distribution Width


 18.2 %


(11.5-14.5)


 


Platelet Count


 172 x10^3/uL


(140-400)


 


Neutrophils (%) (Auto) 75 % (31-73) 


 


Lymphocytes (%) (Auto) 16 % (24-48) 


 


Monocytes (%) (Auto) 7 % (0-9) 


 


Eosinophils (%) (Auto) 2 % (0-3) 


 


Basophils (%) (Auto) 0 % (0-3) 


 


Neutrophils # (Auto)


 4.3 x10^3/uL


(1.8-7.7)


 


Lymphocytes # (Auto)


 0.9 x10^3/uL


(1.0-4.8)


 


Monocytes # (Auto)


 0.4 x10^3/uL


(0.0-1.1)


 


Eosinophils # (Auto)


 0.1 x10^3/uL


(0.0-0.7)


 


Basophils # (Auto)


 0.0 x10^3/uL


(0.0-0.2)


 


Sodium Level


 139 mmol/L


(136-145)


 


Potassium Level


 3.5 mmol/L


(3.5-5.1)


 


Chloride Level


 110 mmol/L


()


 


Carbon Dioxide Level


 18 mmol/L


(21-32)


 


Anion Gap 11 (6-14) 


 


Blood Urea Nitrogen


 15 mg/dL


(8-26)


 


Creatinine


 1.1 mg/dL


(0.7-1.3)


 


Estimated GFR


(Cockcroft-Gault) 68.8 





 


BUN/Creatinine Ratio 14 (6-20) 


 


Glucose Level


 92 mg/dL


(70-99)


 


Calcium Level


 8.1 mg/dL


(8.5-10.1)


 


Total Bilirubin


 0.7 mg/dL


(0.2-1.0)


 


Aspartate Amino Transf


(AST/SGOT) 10 U/L (15-37) 





 


Alanine Aminotransferase


(ALT/SGPT) 17 U/L (16-63) 





 


Alkaline Phosphatase


 64 U/L


()


 


Total Protein


 5.1 g/dL


(6.4-8.2)


 


Albumin


 1.8 g/dL


(3.4-5.0)


 


Albumin/Globulin Ratio 0.5 (1.0-1.7) 











Assessment and Plan


Assessmemt and Plan


Problems


Medical Problems:


(1) Acute GI bleeding


Status: Acute  





(2) Syncope


Status: Acute  











Comment


Review of Relevant


I have reviewed the following items freddy (where applicable) has been applied.


Medications:





Current Medications








 Medications


  (Trade)  Dose


 Ordered  Sig/Kim


 Route


 PRN Reason  Start Time


 Stop Time Status Last Admin


Dose Admin


 


 Zolpidem Tartrate


  (Ambien)  5 mg  PRN QHS  PRN


 PO


 INSOMNIA  4/24/22 21:15


    4/24/22 22:01














Justifications for Admission


Other Justification














ANDREE DIALLO MD        Apr 25, 2022 07:57

## 2022-04-25 NOTE — PDOC
Date of Service:


DATE: 4/25/22 


TIME: 09:15





Subjective:


Subjective:


Hungry.


No abd pain, no bleeding, no stools.





Objective:


Objective:


D/w nurse - pt wants to advance diet.  No bleeding, no GI concerns.


Vital Signs:





                                   Vital Signs








  Date Time  Temp Pulse Resp B/P (MAP) Pulse Ox O2 Delivery O2 Flow Rate FiO2


 


4/25/22 07:54  90  112/79    


 


4/25/22 07:50 98.6  18  99 Room Air  





 98.6       








Labs:





Laboratory Tests








Test


 4/25/22


04:50


 


White Blood Count 5.8 x10^3/uL 


 


Red Blood Count 3.06 x10^6/uL 


 


Hemoglobin 9.1 g/dL 


 


Hematocrit 26.5 % 


 


Mean Corpuscular Volume 87 fL 


 


Mean Corpuscular Hemoglobin 30 pg 


 


Mean Corpuscular Hemoglobin


Concent 34 g/dL 





 


Red Cell Distribution Width 18.2 % 


 


Platelet Count 172 x10^3/uL 


 


Neutrophils (%) (Auto) 75 % 


 


Lymphocytes (%) (Auto) 16 % 


 


Monocytes (%) (Auto) 7 % 


 


Eosinophils (%) (Auto) 2 % 


 


Basophils (%) (Auto) 0 % 


 


Neutrophils # (Auto) 4.3 x10^3/uL 


 


Lymphocytes # (Auto) 0.9 x10^3/uL 


 


Monocytes # (Auto) 0.4 x10^3/uL 


 


Eosinophils # (Auto) 0.1 x10^3/uL 


 


Basophils # (Auto) 0.0 x10^3/uL 


 


Sodium Level 139 mmol/L 


 


Potassium Level 3.5 mmol/L 


 


Chloride Level 110 mmol/L 


 


Carbon Dioxide Level 18 mmol/L 


 


Anion Gap 11 


 


Blood Urea Nitrogen 15 mg/dL 


 


Creatinine 1.1 mg/dL 


 


Estimated GFR


(Cockcroft-Gault) 68.8 





 


BUN/Creatinine Ratio 14 


 


Glucose Level 92 mg/dL 


 


Calcium Level 8.1 mg/dL 


 


Total Bilirubin 0.7 mg/dL 


 


Aspartate Amino Transf


(AST/SGOT) 10 U/L 





 


Alanine Aminotransferase


(ALT/SGPT) 17 U/L 





 


Alkaline Phosphatase 64 U/L 


 


Total Protein 5.1 g/dL 


 


Albumin 1.8 g/dL 


 


Albumin/Globulin Ratio 0.5 











PE:





GEN: NAD, drinking clear liquids


LUNGS: CTAB


HEART: RRR


ABD: NABS, S/ND/NT


NEURO/PSYCH: A & O 3





A/P:


Anemia - improved w/ transfusions


H/o DU





--


Advance diet as tolerated - d/w pt and nurse.


Continue PPI BID.





Justicifation of Admission Dx:


Justifications for Admission:


Justification of Admission Dx:  Yes











ROSA ISELA TOLEDO         Apr 25, 2022 09:17

## 2022-04-26 VITALS — DIASTOLIC BLOOD PRESSURE: 90 MMHG | SYSTOLIC BLOOD PRESSURE: 136 MMHG

## 2022-04-26 VITALS — SYSTOLIC BLOOD PRESSURE: 127 MMHG | DIASTOLIC BLOOD PRESSURE: 76 MMHG

## 2022-04-26 VITALS — DIASTOLIC BLOOD PRESSURE: 81 MMHG | SYSTOLIC BLOOD PRESSURE: 115 MMHG

## 2022-04-26 LAB
HCT VFR BLD CALC: 25.9 % (ref 39–53)
HGB BLD-MCNC: 8.8 G/DL (ref 13–17.5)
MCHC RBC AUTO-ENTMCNC: 34 G/DL (ref 31–37)

## 2022-04-26 RX ADMIN — PANTOPRAZOLE SODIUM SCH MG: 40 TABLET, DELAYED RELEASE ORAL at 07:48

## 2022-04-26 RX ADMIN — METOPROLOL SUCCINATE SCH MG: 50 TABLET, EXTENDED RELEASE ORAL at 09:25

## 2022-04-26 NOTE — DS
DATE OF DISCHARGE: 04/26/2022

ADMISSION DIAGNOSIS:  Gastrointestinal bleed.



DISCHARGE DIAGNOSES:  Resolving gastrointestinal bleed, history of duodenal 

ulcer, history of alcohol issues, history of hepatic steatosis, diverticulosis.



CONSULTATIONS:  GI.



PROCEDURES:  None.



HOSPITAL COURSE:  The patient is a pleasant middle-aged male who presented with 

GI bleed.  We admitted him.  He was in the ICU.  We transfused him.  We 

consulted GI.  Gave him IV proton pump inhibitors.  Today, his hemoglobin is 

stable at 8.8.  He is doing well and wants to go home.  I discussed the case 

with case management and the nurse.  We plan to discharge on proton pump 

inhibitors.



DISPOSITION:  Home.



ACTIVITY:  As tolerated.



DIET:  Low sodium.



DISCHARGE MEDICATIONS:  Please see the MRAD. Protonix 40 mg p.o. b.i.d., aspirin

81 mg a day and metoprolol 50 mg daily.



Total time 32 minutes.







LINN

DR: Anitra   DD: 04/26/2022 09:37

DT: 04/26/2022 09:46   TID: [id number]

## 2022-04-26 NOTE — NUR
Discharge Note:



RUBY HAUSER



Discharge instructions and discharge home medications reviewed with Patient and a copy 
given. All questions have been answered and understanding verbalized. 



The following instructions and handouts were given: discharge instructions, new 
prescription, education and follow up recommendation.



Discontinued lines and drains: Peripheral IV discontinued intact.



Patient discharged to Home or Self Care with Family Member via Wheelchair off unit by CNA

## 2022-04-26 NOTE — PDOC
Date of Service:


DATE: 4/26/22 


TIME: 10:20





Subjective:


Subjective:


Tolerating diet.  Small dark stool yesterday - thinks leftover bleeding maybe.


Has some pain in middle finger - reports h/o gout and maybe RA.





Objective:


Vital Signs:





                                   Vital Signs








  Date Time  Temp Pulse Resp B/P (MAP) Pulse Ox O2 Delivery O2 Flow Rate FiO2


 


4/26/22 09:25  98  136/90    


 


4/26/22 08:00      Room Air  


 


4/26/22 07:00 98.5  18  96   





 98.5       








Labs:





Laboratory Tests








Test


 4/26/22


07:00


 


Hemoglobin 8.8 g/dL 


 


Hematocrit 25.9 % 


 


Mean Corpuscular Hemoglobin


Concent 34 g/dL 














PE:





GEN: NAD


LUNGS: CTAB


HEART: RRR


ABD: S/ND/NT


NEURO/PSYCH: A & O 3





A/P:


Anemia - improved/stable


H/o DU





--


DC per primary.


Continue PPI - confirmed Rx was left per primary.


F/u re: H. pylori stool antigen.





Justicifation of Admission Dx:


Justifications for Admission:


Justification of Admission Dx:  Yes











ROSA ISELA TOLEDO         Apr 26, 2022 10:25

## 2022-04-26 NOTE — PDOC
[facility] [facility] PROGRESS NOTE


Date of Service


DOS:


DATE: 4/26/22 


TIME: 09:33





Chief Complaint


Chief Complaint


Acute Anemia - likely rebleed


Peptic ulcer disease - with duodenal ulcer that sounds to be chronic


Hepatic steatosis 


Diverticulosis 


History of alcohol issues (he states he quit drinking a couple months ago)





History of Present Illness


History of Present Illness


4/26/2022


Patient seen and examined


Discussed with RN


Chart reviewed


He seems to be stable for discharge


We will go ahead and discharge











Mr Bernal is a 55 y/o male presented to ER after weakness and possible syncopal 

episode at home. Having some dark stools, notably admitted to [facility] 

late February into March with melena.  Initial EGD with duodenal ulcer and clot,

unable to wash off or inject with epinephrine.  Despite 2 EGDs. No varices on 

EGD's.  


Did use PeptoBismol on Wednesday for some non-specific dyspepsia.  No N, V, 

hematemesis nor hematochezia. Has had imaging c/w hepatic steatosis, but no 

cirrhosis.  Prior alcoholic pancreatitis.  Non-smoker.  Has been alcohol abuser,

but quit 4 months ago.  No diarrhea or constipation. 





Last seen by us here in 2018 when admitted with perforated diverticulitis; had 

drain, but no resection.


Still with Hb of 6.3 WBC 15.8.  Typed and screened and admitted to ICU for 

further care with gastroenterology consultation





4/25:  Hb 9.1 CR 1.1.  No dizziness at this time.  Seen bedside in ICU.  Asking 

for more to eat





Vitals/I&O


Vitals/I&O:





                                   Vital Signs








  Date Time  Temp Pulse Resp B/P (MAP) Pulse Ox O2 Delivery O2 Flow Rate FiO2


 


4/26/22 09:25  98  136/90    


 


4/26/22 07:00 98.5  18  96 Room Air  





 98.5       














                                    I & O   


 


 4/25/22 4/25/22 4/26/22





 15:00 23:00 07:00


 


Intake Total 1080 ml  240 ml


 


Output Total 150 ml  


 


Balance 930 ml  240 ml











Physical Exam


General:  Alert, Oriented X3, Cooperative


Heart:  Regular rate


Lungs:  Clear





Labs


Labs:





Laboratory Tests








Test


 4/26/22


07:00


 


Hemoglobin


 8.8 g/dL


(13.0-17.5)


 


Hematocrit


 25.9 %


(39.0-53.0)


 


Mean Corpuscular Hemoglobin


Concent 34 g/dL


(31-37)











Assessment and Plan


Assessmemt and Plan


Problems


Medical Problems:


(1) Acute GI bleeding


Status: Acute  





(2) Syncope


Status: Acute  





Acute Anemia - likely rebleed


Peptic ulcer disease - with duodenal ulcer that sounds to be chronic


Hepatic steatosis 


Diverticulosis 


History of alcohol issues (he states he quit drinking a couple months ago)





Plan


Discharge see dictation





Comment


Review of Relevant


I have reviewed the following items freddy (where applicable) has been applied.





Justifications for Admission


Other Justification














STANLEY RODRIGUEZ III DO           Apr 26, 2022 09:34